# Patient Record
Sex: FEMALE | Race: BLACK OR AFRICAN AMERICAN | Employment: OTHER | URBAN - METROPOLITAN AREA
[De-identification: names, ages, dates, MRNs, and addresses within clinical notes are randomized per-mention and may not be internally consistent; named-entity substitution may affect disease eponyms.]

---

## 2021-07-26 ENCOUNTER — APPOINTMENT (OUTPATIENT)
Dept: ULTRASOUND IMAGING | Facility: HOSPITAL | Age: 76
End: 2021-07-26
Attending: EMERGENCY MEDICINE
Payer: MEDICARE

## 2021-07-26 ENCOUNTER — APPOINTMENT (OUTPATIENT)
Dept: GENERAL RADIOLOGY | Facility: HOSPITAL | Age: 76
End: 2021-07-26
Attending: EMERGENCY MEDICINE
Payer: MEDICARE

## 2021-07-26 ENCOUNTER — HOSPITAL ENCOUNTER (EMERGENCY)
Facility: HOSPITAL | Age: 76
Discharge: HOME | End: 2021-07-26
Attending: EMERGENCY MEDICINE
Payer: MEDICARE

## 2021-07-26 VITALS
TEMPERATURE: 98 F | DIASTOLIC BLOOD PRESSURE: 60 MMHG | HEART RATE: 53 BPM | SYSTOLIC BLOOD PRESSURE: 106 MMHG | RESPIRATION RATE: 16 BRPM | WEIGHT: 158 LBS | OXYGEN SATURATION: 100 %

## 2021-07-26 DIAGNOSIS — S83.91XA SPRAIN OF RIGHT KNEE, UNSPECIFIED LIGAMENT, INITIAL ENCOUNTER: Primary | ICD-10-CM

## 2021-07-26 PROCEDURE — 99284 EMERGENCY DEPT VISIT MOD MDM: CPT

## 2021-07-26 PROCEDURE — 93971 EXTREMITY STUDY: CPT | Performed by: EMERGENCY MEDICINE

## 2021-07-26 PROCEDURE — 73560 X-RAY EXAM OF KNEE 1 OR 2: CPT | Performed by: EMERGENCY MEDICINE

## 2021-07-26 RX ORDER — PREDNISONE 20 MG/1
40 TABLET ORAL DAILY
Qty: 6 TABLET | Refills: 0 | Status: SHIPPED | OUTPATIENT
Start: 2021-07-26 | End: 2021-07-29

## 2021-07-27 NOTE — ED PROVIDER NOTES
Patient Seen in: [facility] AND [facility] Emergency Department    History   Patient presents with:  Swelling Edema    Stated Complaint: swelling in legs    HPI    HPI: Asif Rojas is a 64year old female who presents with pain in r knee that started last week result:XR KNEE (1 OR 2 VIEWS), RIGHT (CPT=73560)    Result Date: 7/26/2021  CONCLUSION:  1. No acute findings. 2. Tricompartmental osteoarthritis of the right knee.     Dictated by (CST): Joe Ceballos MD on 7/26/2021 at 8:41 PM     Finalized by (

## 2021-11-30 ENCOUNTER — HOSPITAL ENCOUNTER (INPATIENT)
Facility: HOSPITAL | Age: 76
LOS: 10 days | Discharge: NURSING, REHAB OR LONG-TERM CARE | DRG: 177 | End: 2021-12-10
Attending: EMERGENCY MEDICINE | Admitting: HOSPITALIST
Payer: MEDICARE

## 2021-11-30 ENCOUNTER — APPOINTMENT (OUTPATIENT)
Dept: CT IMAGING | Facility: HOSPITAL | Age: 76
DRG: 177 | End: 2021-11-30
Attending: EMERGENCY MEDICINE
Payer: MEDICARE

## 2021-11-30 ENCOUNTER — APPOINTMENT (OUTPATIENT)
Dept: GENERAL RADIOLOGY | Facility: HOSPITAL | Age: 76
DRG: 177 | End: 2021-11-30
Payer: MEDICARE

## 2021-11-30 DIAGNOSIS — U07.1 COVID: Primary | ICD-10-CM

## 2021-11-30 DIAGNOSIS — R77.8 ELEVATED TROPONIN: ICD-10-CM

## 2021-11-30 LAB
ALBUMIN SERPL-MCNC: 2.6 G/DL (ref 3.4–5)
ALP LIVER SERPL-CCNC: 63 U/L
ALT SERPL-CCNC: 29 U/L
ANION GAP SERPL CALC-SCNC: 8 MMOL/L (ref 0–18)
AST SERPL-CCNC: 38 U/L (ref 15–37)
BILIRUB DIRECT SERPL-MCNC: 0.1 MG/DL (ref 0–0.2)
BILIRUB SERPL-MCNC: 0.4 MG/DL (ref 0.1–2)
BILIRUB UR QL: NEGATIVE
BUN BLD-MCNC: 42 MG/DL (ref 7–18)
BUN/CREAT SERPL: 26.1 (ref 10–20)
CALCIUM BLD-MCNC: 8.8 MG/DL (ref 8.5–10.1)
CHLORIDE SERPL-SCNC: 100 MMOL/L (ref 98–112)
CHOLEST SERPL-MCNC: 100 MG/DL (ref ?–200)
CLARITY UR: CLEAR
CO2 SERPL-SCNC: 26 MMOL/L (ref 21–32)
COLOR UR: YELLOW
CREAT BLD-MCNC: 1.61 MG/DL
CRP SERPL-MCNC: 7.36 MG/DL (ref ?–0.3)
D DIMER PPP FEU-MCNC: 3.73 UG/ML FEU (ref ?–0.78)
DEPRECATED HBV CORE AB SER IA-ACNC: 326 NG/ML
DIGOXIN SERPL-MCNC: 0.62 NG/ML (ref 0.8–2)
GLUCOSE BLD-MCNC: 102 MG/DL (ref 70–99)
GLUCOSE BLDC GLUCOMTR-MCNC: 100 MG/DL (ref 70–99)
GLUCOSE UR-MCNC: NEGATIVE MG/DL
HDLC SERPL-MCNC: 31 MG/DL (ref 40–59)
HGB UR QL STRIP.AUTO: NEGATIVE
KETONES UR-MCNC: NEGATIVE MG/DL
LACTATE SERPL-SCNC: 1.3 MMOL/L (ref 0.4–2)
LDLC SERPL CALC-MCNC: 43 MG/DL (ref ?–100)
LEUKOCYTE ESTERASE UR QL STRIP.AUTO: NEGATIVE
NITRITE UR QL STRIP.AUTO: NEGATIVE
NONHDLC SERPL-MCNC: 69 MG/DL (ref ?–130)
OSMOLALITY SERPL CALC.SUM OF ELEC: 289 MOSM/KG (ref 275–295)
PH UR: 5 [PH] (ref 5–8)
POTASSIUM SERPL-SCNC: 5 MMOL/L (ref 3.5–5.1)
PROCALCITONIN SERPL-MCNC: 0.61 NG/ML (ref ?–0.16)
PROT SERPL-MCNC: 8 G/DL (ref 6.4–8.2)
PROT UR-MCNC: NEGATIVE MG/DL
SARS-COV-2 RNA RESP QL NAA+PROBE: DETECTED
SODIUM SERPL-SCNC: 134 MMOL/L (ref 136–145)
SP GR UR STRIP: 1.01 (ref 1–1.03)
TRIGL SERPL-MCNC: 149 MG/DL (ref 30–149)
TROPONIN I SERPL-MCNC: 0.06 NG/ML (ref ?–0.04)
TROPONIN I SERPL-MCNC: 0.07 NG/ML (ref ?–0.04)
UROBILINOGEN UR STRIP-ACNC: <2
VLDLC SERPL CALC-MCNC: 21 MG/DL (ref 0–30)

## 2021-11-30 PROCEDURE — 99222 1ST HOSP IP/OBS MODERATE 55: CPT | Performed by: INTERNAL MEDICINE

## 2021-11-30 PROCEDURE — 99223 1ST HOSP IP/OBS HIGH 75: CPT | Performed by: HOSPITALIST

## 2021-11-30 PROCEDURE — XW033G6 INTRODUCTION OF REGN-COV2 MONOCLONAL ANTIBODY INTO PERIPHERAL VEIN, PERCUTANEOUS APPROACH, NEW TECHNOLOGY GROUP 6: ICD-10-PCS | Performed by: HOSPITALIST

## 2021-11-30 PROCEDURE — 71045 X-RAY EXAM CHEST 1 VIEW: CPT | Performed by: EMERGENCY MEDICINE

## 2021-11-30 PROCEDURE — 71260 CT THORAX DX C+: CPT | Performed by: EMERGENCY MEDICINE

## 2021-11-30 RX ORDER — METOCLOPRAMIDE HYDROCHLORIDE 5 MG/ML
5 INJECTION INTRAMUSCULAR; INTRAVENOUS EVERY 8 HOURS PRN
Status: DISCONTINUED | OUTPATIENT
Start: 2021-11-30 | End: 2021-12-10

## 2021-11-30 RX ORDER — DEXTROSE MONOHYDRATE 25 G/50ML
50 INJECTION, SOLUTION INTRAVENOUS
Status: DISCONTINUED | OUTPATIENT
Start: 2021-11-30 | End: 2021-12-10

## 2021-11-30 RX ORDER — TEMAZEPAM 15 MG/1
15 CAPSULE ORAL NIGHTLY PRN
Status: DISCONTINUED | OUTPATIENT
Start: 2021-11-30 | End: 2021-12-10

## 2021-11-30 RX ORDER — ASPIRIN 81 MG/1
324 TABLET, CHEWABLE ORAL ONCE
Status: COMPLETED | OUTPATIENT
Start: 2021-11-30 | End: 2021-11-30

## 2021-11-30 RX ORDER — ONDANSETRON 2 MG/ML
4 INJECTION INTRAMUSCULAR; INTRAVENOUS EVERY 6 HOURS PRN
Status: DISCONTINUED | OUTPATIENT
Start: 2021-11-30 | End: 2021-12-10

## 2021-11-30 RX ORDER — ACETAMINOPHEN 325 MG/1
650 TABLET ORAL EVERY 6 HOURS PRN
Status: DISCONTINUED | OUTPATIENT
Start: 2021-11-30 | End: 2021-12-10

## 2021-11-30 RX ORDER — SODIUM CHLORIDE 9 MG/ML
INJECTION, SOLUTION INTRAVENOUS CONTINUOUS
Status: DISCONTINUED | OUTPATIENT
Start: 2021-11-30 | End: 2021-12-09

## 2021-12-01 LAB
ALBUMIN SERPL-MCNC: 2.5 G/DL (ref 3.4–5)
ALBUMIN/GLOB SERPL: 0.6 {RATIO} (ref 1–2)
ALP LIVER SERPL-CCNC: 59 U/L
ALT SERPL-CCNC: 24 U/L
ANION GAP SERPL CALC-SCNC: 11 MMOL/L (ref 0–18)
AST SERPL-CCNC: 36 U/L (ref 15–37)
BASOPHILS # BLD AUTO: 0.02 X10(3) UL (ref 0–0.2)
BASOPHILS # BLD AUTO: 0.03 X10(3) UL (ref 0–0.2)
BASOPHILS NFR BLD AUTO: 0.3 %
BASOPHILS NFR BLD AUTO: 0.7 %
BILIRUB SERPL-MCNC: 0.4 MG/DL (ref 0.1–2)
BILIRUB UR QL: NEGATIVE
BUN BLD-MCNC: 41 MG/DL (ref 7–18)
BUN/CREAT SERPL: 28.9 (ref 10–20)
CALCIUM BLD-MCNC: 7.6 MG/DL (ref 8.5–10.1)
CHLORIDE SERPL-SCNC: 105 MMOL/L (ref 98–112)
CLARITY UR: CLEAR
CO2 SERPL-SCNC: 23 MMOL/L (ref 21–32)
COLOR UR: YELLOW
CREAT BLD-MCNC: 1.42 MG/DL
CREAT UR-SCNC: 71.6 MG/DL
DEPRECATED RDW RBC AUTO: 49.5 FL (ref 35.1–46.3)
DEPRECATED RDW RBC AUTO: 49.8 FL (ref 35.1–46.3)
EOSINOPHIL # BLD AUTO: 0.02 X10(3) UL (ref 0–0.7)
EOSINOPHIL # BLD AUTO: 0.05 X10(3) UL (ref 0–0.7)
EOSINOPHIL NFR BLD AUTO: 0.3 %
EOSINOPHIL NFR BLD AUTO: 1.1 %
ERYTHROCYTE [DISTWIDTH] IN BLOOD BY AUTOMATED COUNT: 16 % (ref 11–15)
ERYTHROCYTE [DISTWIDTH] IN BLOOD BY AUTOMATED COUNT: 16.1 % (ref 11–15)
EST. AVERAGE GLUCOSE BLD GHB EST-MCNC: 154 MG/DL (ref 68–126)
GLOBULIN PLAS-MCNC: 4.3 G/DL (ref 2.8–4.4)
GLUCOSE BLD-MCNC: 83 MG/DL (ref 70–99)
GLUCOSE BLDC GLUCOMTR-MCNC: 162 MG/DL (ref 70–99)
GLUCOSE BLDC GLUCOMTR-MCNC: 88 MG/DL (ref 70–99)
GLUCOSE BLDC GLUCOMTR-MCNC: 88 MG/DL (ref 70–99)
GLUCOSE BLDC GLUCOMTR-MCNC: 96 MG/DL (ref 70–99)
GLUCOSE UR-MCNC: NEGATIVE MG/DL
HBA1C MFR BLD: 7 % (ref ?–5.7)
HCT VFR BLD AUTO: 42.3 %
HCT VFR BLD AUTO: 47 %
HGB BLD-MCNC: 13.3 G/DL
HGB BLD-MCNC: 14.6 G/DL
IMM GRANULOCYTES # BLD AUTO: 0.02 X10(3) UL (ref 0–1)
IMM GRANULOCYTES # BLD AUTO: 0.03 X10(3) UL (ref 0–1)
IMM GRANULOCYTES NFR BLD: 0.4 %
IMM GRANULOCYTES NFR BLD: 0.5 %
KETONES UR-MCNC: NEGATIVE MG/DL
L PNEUMO AG UR QL: NEGATIVE
LDH SERPL L TO P-CCNC: 188 U/L
LEUKOCYTE ESTERASE UR QL STRIP.AUTO: NEGATIVE
LYMPHOCYTES # BLD AUTO: 2.18 X10(3) UL (ref 1–4)
LYMPHOCYTES # BLD AUTO: 2.27 X10(3) UL (ref 1–4)
LYMPHOCYTES NFR BLD AUTO: 39.5 %
LYMPHOCYTES NFR BLD AUTO: 47.6 %
MCH RBC QN AUTO: 26.4 PG (ref 26–34)
MCH RBC QN AUTO: 26.4 PG (ref 26–34)
MCHC RBC AUTO-ENTMCNC: 31.1 G/DL (ref 31–37)
MCHC RBC AUTO-ENTMCNC: 31.4 G/DL (ref 31–37)
MCV RBC AUTO: 84.1 FL
MCV RBC AUTO: 84.8 FL
MONOCYTES # BLD AUTO: 0.57 X10(3) UL (ref 0.1–1)
MONOCYTES # BLD AUTO: 0.83 X10(3) UL (ref 0.1–1)
MONOCYTES NFR BLD AUTO: 12.4 %
MONOCYTES NFR BLD AUTO: 14.4 %
NEUTROPHILS # BLD AUTO: 1.73 X10 (3) UL (ref 1.5–7.7)
NEUTROPHILS # BLD AUTO: 1.73 X10(3) UL (ref 1.5–7.7)
NEUTROPHILS # BLD AUTO: 2.58 X10 (3) UL (ref 1.5–7.7)
NEUTROPHILS # BLD AUTO: 2.58 X10(3) UL (ref 1.5–7.7)
NEUTROPHILS NFR BLD AUTO: 37.8 %
NEUTROPHILS NFR BLD AUTO: 45 %
NITRITE UR QL STRIP.AUTO: NEGATIVE
NT-PROBNP SERPL-MCNC: 428 PG/ML (ref ?–450)
OSMOLALITY SERPL CALC.SUM OF ELEC: 297 MOSM/KG (ref 275–295)
PH UR: 5 [PH] (ref 5–8)
PLATELET # BLD AUTO: 217 10(3)UL (ref 150–450)
PLATELET # BLD AUTO: 247 10(3)UL (ref 150–450)
POTASSIUM SERPL-SCNC: 4.2 MMOL/L (ref 3.5–5.1)
PROT SERPL-MCNC: 6.8 G/DL (ref 6.4–8.2)
PROT UR-MCNC: NEGATIVE MG/DL
RBC # BLD AUTO: 5.03 X10(6)UL
RBC # BLD AUTO: 5.54 X10(6)UL
SODIUM SERPL-SCNC: 139 MMOL/L (ref 136–145)
SODIUM SERPL-SCNC: 74 MMOL/L
SP GR UR STRIP: 1.03 (ref 1–1.03)
STREP PNEUMO ANTIGEN, URINE: NEGATIVE
UROBILINOGEN UR STRIP-ACNC: <2
WBC # BLD AUTO: 4.6 X10(3) UL (ref 4–11)
WBC # BLD AUTO: 5.8 X10(3) UL (ref 4–11)

## 2021-12-01 PROCEDURE — 99233 SBSQ HOSP IP/OBS HIGH 50: CPT | Performed by: PHYSICIAN ASSISTANT

## 2021-12-01 PROCEDURE — XW033E5 INTRODUCTION OF REMDESIVIR ANTI-INFECTIVE INTO PERIPHERAL VEIN, PERCUTANEOUS APPROACH, NEW TECHNOLOGY GROUP 5: ICD-10-PCS | Performed by: HOSPITALIST

## 2021-12-01 PROCEDURE — 99233 SBSQ HOSP IP/OBS HIGH 50: CPT | Performed by: HOSPITALIST

## 2021-12-01 RX ORDER — ISOSORBIDE MONONITRATE 30 MG/1
30 TABLET, EXTENDED RELEASE ORAL DAILY
COMMUNITY
End: 2021-12-10

## 2021-12-01 RX ORDER — SPIRONOLACTONE 25 MG/1
25 TABLET ORAL DAILY
COMMUNITY
End: 2021-12-10

## 2021-12-01 RX ORDER — METOPROLOL TARTRATE 50 MG/1
50 TABLET, FILM COATED ORAL
Status: DISCONTINUED | OUTPATIENT
Start: 2021-12-01 | End: 2021-12-01

## 2021-12-01 RX ORDER — METOPROLOL TARTRATE 50 MG/1
50 TABLET, FILM COATED ORAL 2 TIMES DAILY
COMMUNITY

## 2021-12-01 RX ORDER — PREDNISONE 1 MG/1
1 TABLET ORAL 2 TIMES DAILY WITH MEALS
Status: DISCONTINUED | OUTPATIENT
Start: 2021-12-02 | End: 2021-12-10

## 2021-12-01 RX ORDER — ALBUTEROL SULFATE 90 UG/1
2 AEROSOL, METERED RESPIRATORY (INHALATION) EVERY 6 HOURS PRN
COMMUNITY

## 2021-12-01 RX ORDER — DILTIAZEM HYDROCHLORIDE 120 MG/1
120 CAPSULE, EXTENDED RELEASE ORAL DAILY
Status: DISCONTINUED | OUTPATIENT
Start: 2021-12-01 | End: 2021-12-02

## 2021-12-01 RX ORDER — PANTOPRAZOLE SODIUM 20 MG/1
20 TABLET, DELAYED RELEASE ORAL ONCE
Status: COMPLETED | OUTPATIENT
Start: 2021-12-01 | End: 2021-12-01

## 2021-12-01 RX ORDER — DILTIAZEM HYDROCHLORIDE 120 MG/1
120 CAPSULE, EXTENDED RELEASE ORAL DAILY
COMMUNITY

## 2021-12-01 RX ORDER — FUROSEMIDE 20 MG/1
20 TABLET ORAL 2 TIMES DAILY
COMMUNITY
End: 2021-12-10

## 2021-12-01 RX ORDER — PREDNISONE 1 MG/1
1 TABLET ORAL 2 TIMES DAILY WITH MEALS
COMMUNITY

## 2021-12-01 RX ORDER — PANTOPRAZOLE SODIUM 20 MG/1
20 TABLET, DELAYED RELEASE ORAL
Status: DISCONTINUED | OUTPATIENT
Start: 2021-12-02 | End: 2021-12-03

## 2021-12-01 RX ORDER — MONTELUKAST SODIUM 10 MG/1
10 TABLET ORAL DAILY
Status: DISCONTINUED | OUTPATIENT
Start: 2021-12-01 | End: 2021-12-10

## 2021-12-01 RX ORDER — ISOSORBIDE MONONITRATE 30 MG/1
30 TABLET, EXTENDED RELEASE ORAL DAILY
Status: DISCONTINUED | OUTPATIENT
Start: 2021-12-01 | End: 2021-12-02

## 2021-12-01 RX ORDER — DIGOXIN 125 MCG
125 TABLET ORAL DAILY
Status: DISCONTINUED | OUTPATIENT
Start: 2021-12-01 | End: 2021-12-02

## 2021-12-01 RX ORDER — DOCUSATE SODIUM 100 MG/1
100 CAPSULE, LIQUID FILLED ORAL 2 TIMES DAILY
Status: DISCONTINUED | OUTPATIENT
Start: 2021-12-01 | End: 2021-12-10

## 2021-12-01 RX ORDER — OMEPRAZOLE 20 MG/1
40 CAPSULE, DELAYED RELEASE ORAL
COMMUNITY
End: 2021-12-10

## 2021-12-01 RX ORDER — SUCRALFATE 1 G/1
1 TABLET ORAL DAILY
Status: DISCONTINUED | OUTPATIENT
Start: 2021-12-01 | End: 2021-12-10

## 2021-12-01 RX ORDER — DIGOXIN 125 MCG
TABLET ORAL DAILY
COMMUNITY

## 2021-12-01 RX ORDER — SUCRALFATE 1 G/1
1 TABLET ORAL DAILY
COMMUNITY
End: 2021-12-10

## 2021-12-01 RX ORDER — SENNOSIDES 8.6 MG
8.6 TABLET ORAL DAILY
Status: DISCONTINUED | OUTPATIENT
Start: 2021-12-01 | End: 2021-12-10

## 2021-12-01 RX ORDER — MONTELUKAST SODIUM 10 MG/1
10 TABLET ORAL DAILY
COMMUNITY

## 2021-12-01 RX ORDER — ALBUTEROL SULFATE 90 UG/1
2 AEROSOL, METERED RESPIRATORY (INHALATION) EVERY 6 HOURS PRN
Status: DISCONTINUED | OUTPATIENT
Start: 2021-12-01 | End: 2021-12-10

## 2021-12-01 RX ORDER — NITROGLYCERIN 40 MG/1
1 PATCH TRANSDERMAL DAILY
COMMUNITY
End: 2021-12-10

## 2021-12-01 RX ORDER — METHIMAZOLE 5 MG/1
5 TABLET ORAL 3 TIMES DAILY
COMMUNITY
End: 2021-12-10

## 2021-12-01 RX ORDER — POLYETHYLENE GLYCOL 3350 17 G/17G
17 POWDER, FOR SOLUTION ORAL DAILY
Status: DISCONTINUED | OUTPATIENT
Start: 2021-12-01 | End: 2021-12-10

## 2021-12-01 RX ORDER — HEPARIN SODIUM 5000 [USP'U]/ML
5000 INJECTION, SOLUTION INTRAVENOUS; SUBCUTANEOUS EVERY 8 HOURS SCHEDULED
Status: DISCONTINUED | OUTPATIENT
Start: 2021-12-01 | End: 2021-12-07

## 2021-12-01 RX ORDER — LOSARTAN POTASSIUM 50 MG/1
50 TABLET ORAL 2 TIMES DAILY
COMMUNITY
End: 2021-12-10

## 2021-12-01 NOTE — DIETARY NOTE
ADULT NUTRITION INITIAL ASSESSMENT    Pt is at high nutrition risk. Pt meets severe malnutrition criteria.       CRITERIA FOR MALNUTRITION DIAGNOSIS:  Criteria for severe malnutrition diagnosis: chronic illness related to wt loss greater than 7.5% in 3 mon muscle mass temple region, clavicle region and shoulder region per visual exam.  - Fluid Accumulation: none  per RN documentation  - Skin Integrity: intact per RN documentation    ANTHROPOMETRICS:  HT: 170.2 cm (5' 7\")  WT: 58.6 kg (129 lb 1.6 oz)   BMI: transfer of nutrition care to new setting or provider: to be determined    MONITOR AND EVALUATE/NUTRITION GOALS:  - Food and Nutrient Intake:      Monitor: adequacy of PO intake, tolerance of PO intake, adequacy of supplement intake, tolerance of supplemen

## 2021-12-01 NOTE — ED QUICK NOTES
Orders for admission, patient is aware of plan and ready to go upstairs. Any questions, please call ED RN Harjit Jorge  at extension [phone].    Type of COVID test sent:Rapid  COVID Suspicion level: POSITIVE    LOC at time of transport:a/ox3    Other pertinent infor

## 2021-12-01 NOTE — ED PROVIDER NOTES
Patient Seen in: [facility] Emergency Department      History   Patient presents with:  Cough/URI  Nausea/Vomiting/Diarrhea    Stated Complaint: cough    Subjective:   HPI  Patient is a 17-year-old female history of A. fib on Eliquis?, diastolic C rhythm. Pulmonary:      Effort: Pulmonary effort is normal. No respiratory distress. Breath sounds: Normal breath sounds. Comments: Cough productive of clear sputum  Abdominal:      General: There is no distension. Tenderness:  There is no 3.73 (*)     All other components within normal limits   RAPID SARS-COV-2 BY PCR - Abnormal; Notable for the following components:    Rapid SARS-CoV-2 by PCR Detected (*)     All other components within normal limits   CBC W/ DIFFERENTIAL - Abnormal; Notab Fibrillation  Reading: A. fib, rate controlled, T wave inversion in inferior leads, no ST changes.                        MDM      Patient is a 67-year-old female history of A. fib on Eliquis?, diastolic CHF, COPD, hypertension, prediabetes, GERD, osteoarth frequent handwashing) according to CDC guidelines.                  Disposition and Plan     Clinical Impression:  COVID  (primary encounter diagnosis)  Elevated troponin     Disposition:  Admit  11/30/2021  7:35 pm    Follow-up:  No follow-up provider spec

## 2021-12-01 NOTE — PROGRESS NOTES
[facility]    Progress Note    Steph Kumar Patient Status:  Inpatient    1945 MRN [id number]   Location [facility]5W Attending Mohsen Aldrich MD   Hosp Day # 1 PCP No primary care provider on file.      Subjective:   Seen base, which spans approximately 4 cm in maximal extent. In the appropriate clinical setting, this finding is suspicious for pneumonia. Associated lung mass/neoplasm cannot be excluded radiographically and without benefit of comparison imaging.   Therefore fibrillation -irregular conduction - occasional ectopic ventricular beat -Right bundle branch block and right axis -possible right ventricular hypertrophy -consider pulmonary disease.  ABNORMAL No previous ECGs available Electronically signed on 11/30/2021 Medicine  12/1/2021

## 2021-12-01 NOTE — CM/SW NOTE
Wednesday 12/1/2021  SW received MDO for advanced directives. Per RN rounds, pt is poor historian. SW called pt's son Lorenzo Lockhart to complete initial assessment and to inquire if pt has HCPOA. No answer, unable to leave VM. Pt is COVID +, on room air.      Pe number. SW attempted to call Emelina/daughter E:[phone] this AM, no answer, VM was left. SW attempted to call Erci/son this AM p: [phone], no answer, unable to leave VM.    @11 AM  SW received call from pt's dtr Humberto Breen.  CRYSTAL discussed therapy recs fo plan. RN aware of DC plan/time. PLAN: pt to DC to [facility] [place] @ 5 PM via [facility], PCS done.   -admitting to room 15A, report is [phone]    SW remains available for support and/or discharge planning.  Please do not hes

## 2021-12-01 NOTE — CONSULTS
[facility] HOSP - [facility]    Report of Consultation    Jona Barnett Patient Status:  Emergency    1945 MRN [id number]   Location [address] Attending Dolores Slaughter MD   Hosp Day # 0 PCP No primary care provider on dizziness,   Psychiatric: denies depression, anxiety  Hematologic: denies bruising        Physical Exam:   Blood pressure 105/72, pulse 75, temperature 99.5 °F (37.5 °C), temperature source Temporal, resp.  rate 24, height 5' 7\" (1.702 m), weight 128 lb (5 COVID-19  2. Left lung opacity  3. Prior nicotine dependence    Plan   -Presents with evidence of fatigue, malaise over 4 days duration. Very poor historian. Concern for underlying COPD.   -Chest x-ray with airspace opacities seen in left lung base approxim

## 2021-12-01 NOTE — PLAN OF CARE
Problem: Patient Centered Care  Goal: Patient preferences are identified and integrated in the patient's plan of care  Description: Interventions:  - What would you like us to know as we care for you?  I live at home with my son   - Provide timely, comple arrhythmias  - Monitor electrolytes and administer replacement therapy as ordered  Outcome: Progressing     Problem: RESPIRATORY - ADULT  Goal: Achieves optimal ventilation and oxygenation  Description: INTERVENTIONS:  - Assess for changes in respiratory s ordered for tonight.

## 2021-12-01 NOTE — PLAN OF CARE
Pt from home with her son. Admitted for progressive weakness and fatigue for the last 2 days. Decreased appetite, and increased coughing. She is alert and oriented x3. Denies chest pain/SOB. Remdesevir started overnight. Placed on IVF 0.9 NS @ 100 ml/hr.  U ADULT  Goal: Achieves optimal ventilation and oxygenation  Description: INTERVENTIONS:  - Assess for changes in respiratory status  - Assess for changes in mentation and behavior  - Position to facilitate oxygenation and minimize respiratory effort  - Oxyg

## 2021-12-01 NOTE — PROGRESS NOTES
[facility]     Hospitalist Progress Note     Aniya Anton Patient Status:  Inpatient    1945 MRN [id number]   Location [facility]5W Attending Lisandra Davenport MD   Hosp Day # 1 PCP No primary care provider on file.      Chief 1.61* 1.42*   GFRAA 35* 41*   GFRNAA 30* 35*   CA 8.8 7.6*   ALB 2.6* 2.5*   * 139   K 5.0 4.2    105   CO2 26.0 23.0   ALKPHO 63 59   AST 38* 36   ALT 29 24   BILT 0.4 0.4   TP 8.0 6.8       Estimated Creatinine Clearance: 30.2 mL/min (A) (bas nodularity and ground-glass opacities suggest an atypical small airways infection/inflammation. A ground-glass nodule in the right middle lobe measures up to 18 mm, potentially infectious/inflammatory.   A pulmonary mass in the lingula measures up to 3.4 c GERD  - PPI     Hyperthyroidism   - check FT4  - cont methimazole. Greater than 35 minutes spent, >50% spent counseling re: treatment plan and workup    Plan of care discussed with patient or family at bedside.     Gabrielle Jarvis MD  [facility]

## 2021-12-01 NOTE — SLP NOTE
ADULT SWALLOWING EVALUATION    ASSESSMENT    ASSESSMENT/OVERALL IMPRESSION:  63 y/o who lives at home with her mother and son.  She has a history of chronic obstructive pulmonary disease; chronic elevated troponin; left ventricular diastolic dysfunction wit breath or sensation of pharyngeal residue. Recommend initiation of soft and bite size diet with thin liquids.   F/u with SLP dept to ensure safety with recs and to determine candidacy for diet upgrade and adherence to precautions and compensatory strategie in the lingula measures up to 3.4 cm, likely either a prominent area of rounded atelectasis or a true pulmonary mass/neoplasm.  Recommend further assessment with follow-up   pulmonary consultation and possible PET-CT. 5. Right thyroid nodule measuring 2. of Swallow: No complaints consistent with possible esophageal involvement            GOALS  Goal #1 The patient will tolerate soft and bite size diet  consistency and thin liquids without overt signs or symptoms of aspiration with 90 % accuracy over 1 sess

## 2021-12-01 NOTE — H&P
[facility]    PATIENT'S NAME: Alan Seo   ATTENDING PHYSICIAN: Eri Hunter MD   PATIENT ACCOUNT#:   [id number]    LOCATION:  [place] [address] RECORD #:   [id number]       YOB: 1945  ADMISSION DATE:       11/30/2021 reconciliation list.  Not clear if the patient takes her medications consistently at home. ALLERGIES:  No known drug allergies. FAMILY HISTORY:  Positive for hypertension and diabetes. SOCIAL HISTORY:  Ex-tobacco user.   No current tobacco, alcohol chest after gentle hydration. Obtain pulmonary consult. Start her on IV Zosyn. Follow inflammatory markers. Follow her oxygenation status. Obtain speech therapy to evaluate for aspiration. Resume her anticoagulation with Eliquis.   Hold on her blood p

## 2021-12-02 ENCOUNTER — APPOINTMENT (OUTPATIENT)
Dept: CV DIAGNOSTICS | Facility: HOSPITAL | Age: 76
DRG: 177 | End: 2021-12-02
Attending: HOSPITALIST
Payer: MEDICARE

## 2021-12-02 LAB
ALBUMIN SERPL-MCNC: 2.1 G/DL (ref 3.4–5)
ALBUMIN/GLOB SERPL: 0.5 {RATIO} (ref 1–2)
ALP LIVER SERPL-CCNC: 53 U/L
ALT SERPL-CCNC: 21 U/L
ANION GAP SERPL CALC-SCNC: 8 MMOL/L (ref 0–18)
AST SERPL-CCNC: 30 U/L (ref 15–37)
BILIRUB SERPL-MCNC: 0.3 MG/DL (ref 0.1–2)
BUN BLD-MCNC: 25 MG/DL (ref 7–18)
BUN/CREAT SERPL: 23.6 (ref 10–20)
CALCIUM BLD-MCNC: 7.8 MG/DL (ref 8.5–10.1)
CHLORIDE SERPL-SCNC: 110 MMOL/L (ref 98–112)
CO2 SERPL-SCNC: 24 MMOL/L (ref 21–32)
CREAT BLD-MCNC: 1.06 MG/DL
DEPRECATED RDW RBC AUTO: 49.9 FL (ref 35.1–46.3)
ERYTHROCYTE [DISTWIDTH] IN BLOOD BY AUTOMATED COUNT: 16 % (ref 11–15)
GLOBULIN PLAS-MCNC: 4 G/DL (ref 2.8–4.4)
GLUCOSE BLD-MCNC: 81 MG/DL (ref 70–99)
GLUCOSE BLDC GLUCOMTR-MCNC: 102 MG/DL (ref 70–99)
GLUCOSE BLDC GLUCOMTR-MCNC: 144 MG/DL (ref 70–99)
GLUCOSE BLDC GLUCOMTR-MCNC: 91 MG/DL (ref 70–99)
GLUCOSE BLDC GLUCOMTR-MCNC: 94 MG/DL (ref 70–99)
HCT VFR BLD AUTO: 36.8 %
HGB BLD-MCNC: 11.8 G/DL
MCH RBC QN AUTO: 26.8 PG (ref 26–34)
MCHC RBC AUTO-ENTMCNC: 32.1 G/DL (ref 31–37)
MCV RBC AUTO: 83.6 FL
OSMOLALITY SERPL CALC.SUM OF ELEC: 297 MOSM/KG (ref 275–295)
PLATELET # BLD AUTO: 191 10(3)UL (ref 150–450)
POTASSIUM SERPL-SCNC: 3.8 MMOL/L (ref 3.5–5.1)
PROCALCITONIN SERPL-MCNC: 0.22 NG/ML (ref ?–0.16)
PROT SERPL-MCNC: 6.1 G/DL (ref 6.4–8.2)
RBC # BLD AUTO: 4.4 X10(6)UL
SODIUM SERPL-SCNC: 142 MMOL/L (ref 136–145)
T4 FREE SERPL-MCNC: 2.4 NG/DL (ref 0.8–1.7)
TSI SER-ACNC: <0.005 MIU/ML (ref 0.36–3.74)
WBC # BLD AUTO: 4.3 X10(3) UL (ref 4–11)

## 2021-12-02 PROCEDURE — 99233 SBSQ HOSP IP/OBS HIGH 50: CPT | Performed by: HOSPITALIST

## 2021-12-02 PROCEDURE — 99232 SBSQ HOSP IP/OBS MODERATE 35: CPT | Performed by: PHYSICIAN ASSISTANT

## 2021-12-02 PROCEDURE — 93306 TTE W/DOPPLER COMPLETE: CPT | Performed by: HOSPITALIST

## 2021-12-02 RX ORDER — BENZONATATE 100 MG/1
100 CAPSULE ORAL 3 TIMES DAILY PRN
Status: DISCONTINUED | OUTPATIENT
Start: 2021-12-02 | End: 2021-12-10

## 2021-12-02 NOTE — PLAN OF CARE
Confused to time. Up with one and walker. From home with 78year old mother and son. 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 On room air. Plan for echo today.    Problem: Patient Centered Care  Goal: Patient preferences are identified and integrated in the patien indicated  - Evaluate effectiveness of antiarrhythmic and heart rate control medications as ordered  - Initiate emergency measures for life threatening arrhythmias  - Monitor electrolytes and administer replacement therapy as ordered  Outcome: Progressing

## 2021-12-02 NOTE — PROGRESS NOTES
[facility]    Progress Note    Aniya Anton Patient Status:  Inpatient    1945 MRN [id number]   Location [facility]5W Attending Lisandra Davenport MD   [facility] Day # 2 PCP No primary care provider on file.      Subjective:   Seen XR CHEST AP PORTABLE  (CPT=71045)    Result Date: 11/30/2021  CONCLUSION:  1. Ill-defined airspace disease at the periphery of the left lung base, which spans approximately 4 cm in maximal extent.   In the appropriate clinical setting, this finding is Carmen Jacobson MD on 11/30/2021 at 10:11 PM          EKG 12 Lead    Result Date: 11/30/2021  ECG Report  Interpretation  -------------------------- Atrial fibrillation -irregular conduction - occasional ectopic ventricular beat -Right bundle branch block and right

## 2021-12-02 NOTE — PLAN OF CARE
Pt alert and oriented x3. Denies chest pain. Occasional SOB with exertion. RA saturating well. Still getting Remdesivir and Zosyn. Reported heartburn during the night. MD notified. IVF 0.9 NS @ 100 ml/hr. Home meds restarted overnight.      Problem: Patient behavior  - Position to facilitate oxygenation and minimize respiratory effort  - Oxygen supplementation based on oxygen saturation or ABGs  - Provide Smoking Cessation handout, if applicable  - Encourage broncho-pulmonary hygiene including cough, deep garret

## 2021-12-02 NOTE — PROGRESS NOTES
[place] [facility] - [facility]     Hospitalist Progress Note     Mireya Lassiter Patient Status:  Inpatient    1945 MRN [id number]   Location [facility]5W Attending Amrik Mena MD   [facility] Day # 2 PCP No primary care provider on file.      Chief CREATSERUM 1.61* 1.42* 1.06*   GFRAA 35* 41* 58*   GFRNAA 30* 35* 50*   CA 8.8 7.6* 7.8*   ALB 2.6* 2.5* 2.1*   * 139 142   K 5.0 4.2 3.8    105 110   CO2 26.0 23.0 24.0   ALKPHO 63 59 53*   AST 38* 36 30   ALT 29 24 21   BILT 0.4 0.4 0.3   T Diffuse coronary artery calcifications and mitral annular calcifications. 3. Moderate to severe atherosclerotic calcification of the visualized aorta. There is mild aneurysmal dilation of the ascending aorta up to 4 cm.   4. Radiographically moderate par following   -daily labs including inflammatory markers    Lingular mass   - CT chest with 3.4cm pulm mass in lingula pulm mass vs neoplasm vs infection.  outpt PET CT recommended  - PCT high   - treat with zosyn for possible asp PNA  - would recommend rpt i

## 2021-12-03 ENCOUNTER — APPOINTMENT (OUTPATIENT)
Dept: CT IMAGING | Facility: HOSPITAL | Age: 76
DRG: 177 | End: 2021-12-03
Attending: HOSPITALIST
Payer: MEDICARE

## 2021-12-03 ENCOUNTER — APPOINTMENT (OUTPATIENT)
Dept: GENERAL RADIOLOGY | Facility: HOSPITAL | Age: 76
DRG: 177 | End: 2021-12-03
Attending: HOSPITALIST
Payer: MEDICARE

## 2021-12-03 LAB
ALBUMIN SERPL-MCNC: 2.2 G/DL (ref 3.4–5)
ALBUMIN/GLOB SERPL: 0.6 {RATIO} (ref 1–2)
ALP LIVER SERPL-CCNC: 56 U/L
ALT SERPL-CCNC: 19 U/L
ANION GAP SERPL CALC-SCNC: 7 MMOL/L (ref 0–18)
AST SERPL-CCNC: 32 U/L (ref 15–37)
BILIRUB SERPL-MCNC: 0.2 MG/DL (ref 0.1–2)
BUN BLD-MCNC: 17 MG/DL (ref 7–18)
BUN/CREAT SERPL: 19.3 (ref 10–20)
CALCIUM BLD-MCNC: 8.1 MG/DL (ref 8.5–10.1)
CHLORIDE SERPL-SCNC: 113 MMOL/L (ref 98–112)
CO2 SERPL-SCNC: 23 MMOL/L (ref 21–32)
CREAT BLD-MCNC: 0.88 MG/DL
CRP SERPL-MCNC: 7.56 MG/DL (ref ?–0.3)
D DIMER PPP FEU-MCNC: 3.09 UG/ML FEU (ref ?–0.78)
DEPRECATED HBV CORE AB SER IA-ACNC: 462.5 NG/ML
GLOBULIN PLAS-MCNC: 4 G/DL (ref 2.8–4.4)
GLUCOSE BLD-MCNC: 81 MG/DL (ref 70–99)
GLUCOSE BLDC GLUCOMTR-MCNC: 109 MG/DL (ref 70–99)
GLUCOSE BLDC GLUCOMTR-MCNC: 80 MG/DL (ref 70–99)
GLUCOSE BLDC GLUCOMTR-MCNC: 88 MG/DL (ref 70–99)
GLUCOSE BLDC GLUCOMTR-MCNC: 88 MG/DL (ref 70–99)
GLUCOSE BLDC GLUCOMTR-MCNC: 90 MG/DL (ref 70–99)
LACTATE SERPL-SCNC: 1.4 MMOL/L (ref 0.4–2)
LDH SERPL L TO P-CCNC: 230 U/L
OSMOLALITY SERPL CALC.SUM OF ELEC: 297 MOSM/KG (ref 275–295)
POTASSIUM SERPL-SCNC: 4.4 MMOL/L (ref 3.5–5.1)
PROT SERPL-MCNC: 6.2 G/DL (ref 6.4–8.2)
SODIUM SERPL-SCNC: 143 MMOL/L (ref 136–145)

## 2021-12-03 PROCEDURE — 99232 SBSQ HOSP IP/OBS MODERATE 35: CPT | Performed by: INTERNAL MEDICINE

## 2021-12-03 PROCEDURE — 71045 X-RAY EXAM CHEST 1 VIEW: CPT | Performed by: HOSPITALIST

## 2021-12-03 PROCEDURE — 70450 CT HEAD/BRAIN W/O DYE: CPT | Performed by: HOSPITALIST

## 2021-12-03 PROCEDURE — 99233 SBSQ HOSP IP/OBS HIGH 50: CPT | Performed by: HOSPITALIST

## 2021-12-03 RX ORDER — CALCIUM CARBONATE 200(500)MG
500 TABLET,CHEWABLE ORAL 3 TIMES DAILY PRN
Status: DISCONTINUED | OUTPATIENT
Start: 2021-12-03 | End: 2021-12-10

## 2021-12-03 RX ORDER — MAGNESIUM HYDROXIDE/ALUMINUM HYDROXICE/SIMETHICONE 120; 1200; 1200 MG/30ML; MG/30ML; MG/30ML
30 SUSPENSION ORAL EVERY 6 HOURS PRN
Status: DISCONTINUED | OUTPATIENT
Start: 2021-12-03 | End: 2021-12-10

## 2021-12-03 RX ORDER — METHIMAZOLE 5 MG/1
5 TABLET ORAL 3 TIMES DAILY
Status: DISCONTINUED | OUTPATIENT
Start: 2021-12-03 | End: 2021-12-04

## 2021-12-03 NOTE — SPIRITUAL CARE NOTE
responded to bedside RN call. RN stated that Pt is requesting a .  requested that telephone will in reach of Pt. Pt is a 64year old female, admitted 11/30/2021.  Pt charts shows:   COVID-19 Positive; Cough, sore throat, sob, n

## 2021-12-03 NOTE — PLAN OF CARE
Pt is A&Ox3. On 2L via nasal cannula for comfort. Complaint of headache. PRNs given. Receiving iv abx and remdesivir. F/u chest x-ray done.  Plan is to finish meds and hh   Problem: Patient Centered Care  Goal: Patient preferences are identified and integr 12 lead EKG if indicated  - Evaluate effectiveness of antiarrhythmic and heart rate control medications as ordered  - Initiate emergency measures for life threatening arrhythmias  - Monitor electrolytes and administer replacement therapy as ordered  Outcom

## 2021-12-03 NOTE — OCCUPATIONAL THERAPY NOTE
OCCUPATIONAL THERAPY EVALUATION - INPATIENT     Room Number: 503/503-A  Evaluation Date: 12/3/2021  Type of Evaluation: Initial  Presenting Problem: cough, SOB ; COVID    Physician Order: IP Consult to Occupational Therapy  Reason for Therapy: ADL/IADL Dys DISCHARGE RECOMMENDATIONS  OT Discharge Recommendations: Sub-acute rehabilitation       PLAN  OT Treatment Plan: Balance activities; Energy conservation/work simplification techniques;ADL training;Functional transfer training; Endurance training;Equipment upper body clothing?: A Lot  -   Taking care of personal grooming such as brushing teeth?: A Little  -   Eating meals?: A Little    AM-PAC Score:  Score: 14  Approx Degree of Impairment: 59.67%  Standardized Score (AM-PAC Scale): 33.39  CMS Modifier (G-Cod

## 2021-12-03 NOTE — SPIRITUAL CARE NOTE
initiated follow up tele-conference visit.  made second attempt. Pt did not respond.  consulted with bedside RN. RN reported that Pt has been resting all day. Pt was asleep on first attempt.   RN stated that she will inform P

## 2021-12-03 NOTE — DIETARY NOTE
Brief Nutrition Update Note    Update 12/03/21:  Pt seen for follow up. Pt with fair intake, eating ~57% x4 meals documented since last visit. Pt reports not feeling well and not wanting to eat due to this. Noted full cup of Glucerna in room, untouched.  Pt

## 2021-12-03 NOTE — PLAN OF CARE
A&Ox3, confused to time. Complaining about more weakness today. States she feel worse today. On room air, tempeture low, will monitor. Pt given warm blanket and temp in room increased. Iv fluids infusing. Pt updated on plan of care.  Fall precautions in wilma baseline  Description: INTERVENTIONS:  - Continuous cardiac monitoring, monitor vital signs, obtain 12 lead EKG if indicated  - Evaluate effectiveness of antiarrhythmic and heart rate control medications as ordered  - Initiate emergency measures for life t ordered  - Instruct patient on fluid and nutrition restrictions as appropriate  Outcome: Progressing

## 2021-12-03 NOTE — PROGRESS NOTES
[facility] - [facility]     Progress Note        Ita Ny Patient Status:  Inpatient    1945 MRN [id number]   Location [facility]5W Attending Charo Van MD   [facility] Day # 3 PCP No primary care provider on file.        Subjective: tab 8 tablet, 8 tablet, Oral, Q15 Min PRN  Insulin Aspart Pen (NOVOLOG) 100 UNIT/ML flexpen 1-7 Units, 1-7 Units, Subcutaneous, TID CC  0.9% NaCl infusion, , Intravenous, Continuous  acetaminophen (TYLENOL) tab 650 mg, 650 mg, Oral, Q6H PRN  ondansetron (Z infectious process or atelectatic changes. Cannot rule out malignancy. Recommend course of antibiotic therapy and follow-up PET/CT as outpatient.  -Anticipate possible discharge later today.   Okay to transition to oral antibiotic therapy with Augmentin u

## 2021-12-03 NOTE — PROGRESS NOTES
[place] [facility] - [facility]     Hospitalist Progress Note     Allyson Nguyễn Patient Status:  Inpatient    1945 MRN [id number]   Location [facility]5W Attending Nisreen Candelaria MD   [facility] Day # 3 PCP No primary care provider on file.      Chief ALKPHO 59 53* 56   AST 36 30 32   ALT 24 21 19   BILT 0.4 0.3 0.2   TP 6.8 6.1* 6.2*       Estimated Creatinine Clearance: 50 mL/min (based on SCr of 0.88 mg/dL). No results for input(s): PTP, INR in the last 168 hours.          Culture:  [facility] possible asp PNA  - would recommend rpt imaging outpt and if persistent can pursue further workup d/w pulm. - SLP for bedside swallow may need to consider video swallow   - keep HOB elevated. Acute metabolic encephalopathy  - ?  Infection related vs o

## 2021-12-03 NOTE — PHYSICAL THERAPY NOTE
PHYSICAL THERAPY EVALUATION - INPATIENT     Room Number: 503/503-A  Evaluation Date: 12/3/2021  Type of Evaluation: Initial   Physician Order: PT Eval and Treat    Presenting Problem: COVID  Reason for Therapy: Mobility Dysfunction and Discharge Planning care. However, patient would benefit from rehab facility. RN aware of patient status post session. Patient will benefit from continued IP PT services to address these deficits in preparation for discharge.     DISCHARGE RECOMMENDATIONS  PT Discharge Shaun from a chair with arms (e.g., wheelchair, bedside commode, etc.): Unable   Patient Difficulty: Moving from lying on back to sitting on the side of the bed?: A Little   How much help from another person does the patient currently need. ..    Help from Another

## 2021-12-04 ENCOUNTER — APPOINTMENT (OUTPATIENT)
Dept: GENERAL RADIOLOGY | Facility: HOSPITAL | Age: 76
DRG: 177 | End: 2021-12-04
Attending: HOSPITALIST
Payer: MEDICARE

## 2021-12-04 LAB
ALBUMIN SERPL-MCNC: 2.5 G/DL (ref 3.4–5)
ALBUMIN/GLOB SERPL: 0.5 {RATIO} (ref 1–2)
ALP LIVER SERPL-CCNC: 66 U/L
ALT SERPL-CCNC: 21 U/L
ANION GAP SERPL CALC-SCNC: 8 MMOL/L (ref 0–18)
AST SERPL-CCNC: 38 U/L (ref 15–37)
BILIRUB SERPL-MCNC: 0.5 MG/DL (ref 0.1–2)
BUN BLD-MCNC: 16 MG/DL (ref 7–18)
BUN/CREAT SERPL: 19 (ref 10–20)
CALCIUM BLD-MCNC: 8.2 MG/DL (ref 8.5–10.1)
CHLORIDE SERPL-SCNC: 112 MMOL/L (ref 98–112)
CO2 SERPL-SCNC: 22 MMOL/L (ref 21–32)
CREAT BLD-MCNC: 0.84 MG/DL
CRP SERPL-MCNC: 8.42 MG/DL (ref ?–0.3)
D DIMER PPP FEU-MCNC: 2.06 UG/ML FEU (ref ?–0.78)
DEPRECATED HBV CORE AB SER IA-ACNC: 475.2 NG/ML
DEPRECATED RDW RBC AUTO: 50.6 FL (ref 35.1–46.3)
ERYTHROCYTE [DISTWIDTH] IN BLOOD BY AUTOMATED COUNT: 16.5 % (ref 11–15)
GLOBULIN PLAS-MCNC: 4.6 G/DL (ref 2.8–4.4)
GLUCOSE BLD-MCNC: 102 MG/DL (ref 70–99)
GLUCOSE BLDC GLUCOMTR-MCNC: 102 MG/DL (ref 70–99)
GLUCOSE BLDC GLUCOMTR-MCNC: 102 MG/DL (ref 70–99)
GLUCOSE BLDC GLUCOMTR-MCNC: 91 MG/DL (ref 70–99)
GLUCOSE BLDC GLUCOMTR-MCNC: 97 MG/DL (ref 70–99)
HCT VFR BLD AUTO: 43.5 %
HGB BLD-MCNC: 13.7 G/DL
LDH SERPL L TO P-CCNC: 308 U/L
MCH RBC QN AUTO: 26.4 PG (ref 26–34)
MCHC RBC AUTO-ENTMCNC: 31.5 G/DL (ref 31–37)
MCV RBC AUTO: 83.8 FL
OSMOLALITY SERPL CALC.SUM OF ELEC: 295 MOSM/KG (ref 275–295)
PLATELET # BLD AUTO: 222 10(3)UL (ref 150–450)
POTASSIUM SERPL-SCNC: 4.4 MMOL/L (ref 3.5–5.1)
PROT SERPL-MCNC: 7.1 G/DL (ref 6.4–8.2)
RBC # BLD AUTO: 5.19 X10(6)UL
SODIUM SERPL-SCNC: 142 MMOL/L (ref 136–145)
WBC # BLD AUTO: 6.5 X10(3) UL (ref 4–11)

## 2021-12-04 PROCEDURE — 71045 X-RAY EXAM CHEST 1 VIEW: CPT | Performed by: HOSPITALIST

## 2021-12-04 PROCEDURE — 99233 SBSQ HOSP IP/OBS HIGH 50: CPT | Performed by: HOSPITALIST

## 2021-12-04 PROCEDURE — 99232 SBSQ HOSP IP/OBS MODERATE 35: CPT | Performed by: INTERNAL MEDICINE

## 2021-12-04 RX ORDER — METOPROLOL TARTRATE 50 MG/1
50 TABLET, FILM COATED ORAL
Status: DISCONTINUED | OUTPATIENT
Start: 2021-12-04 | End: 2021-12-10

## 2021-12-04 RX ORDER — METHIMAZOLE 10 MG/1
10 TABLET ORAL 3 TIMES DAILY
Status: DISCONTINUED | OUTPATIENT
Start: 2021-12-04 | End: 2021-12-10

## 2021-12-04 NOTE — PLAN OF CARE
Problem: Patient Centered Care  Goal: Patient preferences are identified and integrated in the patient's plan of care  Description: Interventions:  - What would you like us to know as we care for you?  I live at home with my son   - Provide timely, comple if indicated  - Evaluate effectiveness of antiarrhythmic and heart rate control medications as ordered  - Initiate emergency measures for life threatening arrhythmias  - Monitor electrolytes and administer replacement therapy as ordered  12/4/2021 1637 by ordered  - Instruct patient on fluid and nutrition restrictions as appropriate  12/4/2021 1637 by Mary Schmidt, RN  Outcome: Progressing     Patient is alert and oriented, poor appetite, RA, SOB with exertion.  Video swallow in AM. Pt stated some abdomi

## 2021-12-04 NOTE — PROGRESS NOTES
Pulmonary/Critical Care Follow Up Note    HPI:   Venita Argueta is a 64year old female with Patient presents with:  Cough/URI  Nausea/Vomiting/Diarrhea      PCP No primary care provider on file.   Admission Attending Rodney Iniguez MD    Hospital Day #4 glucose-vitamin C (DEX-4) chewable tab 8 tablet, 8 tablet, Oral, Q15 Min PRN  •  Insulin Aspart Pen (NOVOLOG) 100 UNIT/ML flexpen 1-7 Units, 1-7 Units, Subcutaneous, TID CC  •  0.9% NaCl infusion, , Intravenous, Continuous  •  acetaminophen (TYLENOL) tab 6

## 2021-12-04 NOTE — PLAN OF CARE
Pt is A&Ox3. On RA. 2L via nasal cannula at times for comfort. Pt complaining of stomach and chest pain. MD notified. PRN Maalox , metoprolol, and increase thyroid medication ordered per md. IV abx and Remdesivir continued. Chest x-ray done.  Plan is to mo baseline  Description: INTERVENTIONS:  - Continuous cardiac monitoring, monitor vital signs, obtain 12 lead EKG if indicated  - Evaluate effectiveness of antiarrhythmic and heart rate control medications as ordered  - Initiate emergency measures for life t ordered  - Instruct patient on fluid and nutrition restrictions as appropriate  Outcome: Progressing

## 2021-12-04 NOTE — CM/SW NOTE
Per call received by Dr Blanco Romano, VIANCA referral MDO. DON screen requested. Aidin referral uploaded in aidin. PLAN;  VIANCA    CM/SW to remain available for support and/or discharge planning.     Mónica Worrell RN, CCM    Ext. [phone]

## 2021-12-04 NOTE — PROGRESS NOTES
[facility]     Hospitalist Progress Note     Renettalilly Silva Patient Status:  Inpatient    1945 MRN [id number]   Location [facility]5W Attending Sofia Pittman MD   Hosp Day # 4 PCP No primary care provider on file.      Chief 2. 1* 2.2* 2.5*    143 142   K 3.8 4.4 4.4    113* 112   CO2 24.0 23.0 22.0   ALKPHO 53* 56 66   AST 30 32 38*   ALT 21 19 21   BILT 0.3 0.2 0.5   TP 6.1* 6.2* 7.1       Estimated Creatinine Clearance: 51.8 mL/min (based on SCr of 0.84 mg/dL). changes in the bilateral upper lung fields suggested. 2. Persistent 5.2 x 2.3 cm opacity at the left lung base could be an area of pneumonia but underlying mass not excluded. Follow-up study and or PET scanning advised.     Dictated by (CST): Ema Boas resolves. - no signs of acute flare.   - cont Albuterol inhaler as needed. H/o HFpEF  Abnormal troponin   Moderate pulm HTN  Chronic a.fib   - Euvolemic   - rpt ECHO reviewed EF normal. LA dilated, moder MVR.  Mild pulm HTN   - check   - hold h

## 2021-12-05 LAB
ALBUMIN SERPL-MCNC: 2 G/DL (ref 3.4–5)
ALBUMIN/GLOB SERPL: 0.5 {RATIO} (ref 1–2)
ALP LIVER SERPL-CCNC: 60 U/L
ALT SERPL-CCNC: 17 U/L
ANION GAP SERPL CALC-SCNC: 6 MMOL/L (ref 0–18)
AST SERPL-CCNC: 29 U/L (ref 15–37)
BILIRUB SERPL-MCNC: 0.5 MG/DL (ref 0.1–2)
BUN BLD-MCNC: 15 MG/DL (ref 7–18)
BUN/CREAT SERPL: 18.8 (ref 10–20)
CALCIUM BLD-MCNC: 8.2 MG/DL (ref 8.5–10.1)
CHLORIDE SERPL-SCNC: 113 MMOL/L (ref 98–112)
CO2 SERPL-SCNC: 23 MMOL/L (ref 21–32)
CREAT BLD-MCNC: 0.8 MG/DL
DEPRECATED RDW RBC AUTO: 49.2 FL (ref 35.1–46.3)
ERYTHROCYTE [DISTWIDTH] IN BLOOD BY AUTOMATED COUNT: 16.3 % (ref 11–15)
GLOBULIN PLAS-MCNC: 4.1 G/DL (ref 2.8–4.4)
GLUCOSE BLD-MCNC: 84 MG/DL (ref 70–99)
GLUCOSE BLDC GLUCOMTR-MCNC: 107 MG/DL (ref 70–99)
GLUCOSE BLDC GLUCOMTR-MCNC: 112 MG/DL (ref 70–99)
GLUCOSE BLDC GLUCOMTR-MCNC: 116 MG/DL (ref 70–99)
GLUCOSE BLDC GLUCOMTR-MCNC: 75 MG/DL (ref 70–99)
HCT VFR BLD AUTO: 37.6 %
HGB BLD-MCNC: 12.1 G/DL
MCH RBC QN AUTO: 26.6 PG (ref 26–34)
MCHC RBC AUTO-ENTMCNC: 32.2 G/DL (ref 31–37)
MCV RBC AUTO: 82.6 FL
OSMOLALITY SERPL CALC.SUM OF ELEC: 294 MOSM/KG (ref 275–295)
PLATELET # BLD AUTO: 233 10(3)UL (ref 150–450)
POTASSIUM SERPL-SCNC: 4.2 MMOL/L (ref 3.5–5.1)
PROT SERPL-MCNC: 6.1 G/DL (ref 6.4–8.2)
RBC # BLD AUTO: 4.55 X10(6)UL
SODIUM SERPL-SCNC: 142 MMOL/L (ref 136–145)
WBC # BLD AUTO: 7 X10(3) UL (ref 4–11)

## 2021-12-05 PROCEDURE — 99232 SBSQ HOSP IP/OBS MODERATE 35: CPT | Performed by: INTERNAL MEDICINE

## 2021-12-05 PROCEDURE — 99233 SBSQ HOSP IP/OBS HIGH 50: CPT | Performed by: HOSPITALIST

## 2021-12-05 NOTE — PLAN OF CARE
Problem: Patient Centered Care  Goal: Patient preferences are identified and integrated in the patient's plan of care  Description: Interventions:  - What would you like us to know as we care for you?  I live at home with my son   - Provide timely, comple arrhythmias  - Monitor electrolytes and administer replacement therapy as ordered  Outcome: Progressing     Problem: RESPIRATORY - ADULT  Goal: Achieves optimal ventilation and oxygenation  Description: INTERVENTIONS:  - Assess for changes in respiratory s

## 2021-12-05 NOTE — PROGRESS NOTES
[facility]    Progress Note    Aniya Anton Patient Status:  Inpatient    1945 MRN [id number]   Location [facility]5W Attending Lisandra Davenport MD   Hosp Day # 5 PCP No primary care provider on file.      Subjective:   Subj CHEST AP PORTABLE  (CPT=71045)    Result Date: 12/4/2021  CONCLUSION:  1. Interval worsening in moderately extensive bilateral airspace disease suggesting worsening bilateral pneumonia. Correlate clinically and follow-up studies are advised.   Minimal blun

## 2021-12-05 NOTE — SLP NOTE
SPEECH DAILY NOTE - INPATIENT    ASSESSMENT & PLAN   ASSESSMENT    PPE REQUIRED. THE PT IS COVID 19+. THIS THERAPIST WORE GOWN, GOGGLES, GLOVES AND N 95 MASK FOR DURATION OF TX SESSION. HANDS WASHED UPON ENTRANCE/EXIT.      Received VFSS orders per MD.  MD throat at a level 7 on the pain scale when swallowing. Oral/buccal cavities clear of residue at the end of the session. Unable to R/O silent aspiration without a VFSS. VFSS to be completed when pt is safe to travel for testing.      CXR 12/4/21:  Keshawn Prakash strategies independently over 2 session(s). Education provided to the pt on swallowing precautions. Discussed no straw precaution and removed the straw from the pt's water cup. Posted swallowing precautions at bedside.   No family was present for educa

## 2021-12-05 NOTE — PLAN OF CARE
IV ABX, RDSV continued. Reports intermittent abdominal discomfort, declined intervention.     Problem: Patient Centered Care  Goal: Patient preferences are identified and integrated in the patient's plan of care  Description: Interventions:  - What would yo rate control medications as ordered  - Initiate emergency measures for life threatening arrhythmias  - Monitor electrolytes and administer replacement therapy as ordered  Outcome: Progressing     Problem: RESPIRATORY - ADULT  Goal: Achieves optimal ventila

## 2021-12-05 NOTE — PROGRESS NOTES
[facility]     Hospitalist Progress Note     Trice Garcia Patient Status:  Inpatient    1945 MRN [id number]   Location [facility]5W Attending Katie Birch MD   Hosp Day # 5 PCP No primary care provider on file.      Chief 67 71   CA 8.1* 8.2* 8.2*   ALB 2.2* 2.5* 2.0*    142 142   K 4.4 4.4 4.2   * 112 113*   CO2 23.0 22.0 23.0   ALKPHO 56 66 60   AST 32 38* 29   ALT 19 21 17   BILT 0.2 0.5 0.5   TP 6.2* 7.1 6.1*       Estimated Creatinine Clearance: 54.4 mL/min sennosides  8.6 mg Oral Daily   • docusate sodium  100 mg Oral BID   • Heparin Sodium (Porcine)  5,000 Units Subcutaneous Q8H [address]   • montelukast  10 mg Oral Daily   • predniSONE  1 mg Oral BID with meals   • sucralfate  1 g Oral Daily   • piperacillin-pritesh MD  Hospitalist      Supplementary Documentation:     Quality:  · Diet: general modified.    · PT/OT: SNF  · DVT Prophylaxis: start heparin  · CODE status: Full   · Dispo: per clinical course      Estimated date of discharge: Monday   Discharge is dependent

## 2021-12-06 ENCOUNTER — APPOINTMENT (OUTPATIENT)
Dept: PICC SERVICES | Facility: HOSPITAL | Age: 76
DRG: 177 | End: 2021-12-06
Attending: HOSPITALIST
Payer: MEDICARE

## 2021-12-06 ENCOUNTER — APPOINTMENT (OUTPATIENT)
Dept: GENERAL RADIOLOGY | Facility: HOSPITAL | Age: 76
DRG: 177 | End: 2021-12-06
Attending: HOSPITALIST
Payer: MEDICARE

## 2021-12-06 LAB
ANION GAP SERPL CALC-SCNC: 7 MMOL/L (ref 0–18)
BUN BLD-MCNC: 18 MG/DL (ref 7–18)
BUN/CREAT SERPL: 22.2 (ref 10–20)
CALCIUM BLD-MCNC: 8.6 MG/DL (ref 8.5–10.1)
CHLORIDE SERPL-SCNC: 115 MMOL/L (ref 98–112)
CO2 SERPL-SCNC: 21 MMOL/L (ref 21–32)
CREAT BLD-MCNC: 0.81 MG/DL
DEPRECATED RDW RBC AUTO: 49 FL (ref 35.1–46.3)
ERYTHROCYTE [DISTWIDTH] IN BLOOD BY AUTOMATED COUNT: 16.3 % (ref 11–15)
GLUCOSE BLD-MCNC: 89 MG/DL (ref 70–99)
GLUCOSE BLDC GLUCOMTR-MCNC: 122 MG/DL (ref 70–99)
GLUCOSE BLDC GLUCOMTR-MCNC: 141 MG/DL (ref 70–99)
GLUCOSE BLDC GLUCOMTR-MCNC: 94 MG/DL (ref 70–99)
GLUCOSE BLDC GLUCOMTR-MCNC: 96 MG/DL (ref 70–99)
HCT VFR BLD AUTO: 38 %
HGB BLD-MCNC: 12.2 G/DL
MCH RBC QN AUTO: 26.5 PG (ref 26–34)
MCHC RBC AUTO-ENTMCNC: 32.1 G/DL (ref 31–37)
MCV RBC AUTO: 82.4 FL
OSMOLALITY SERPL CALC.SUM OF ELEC: 297 MOSM/KG (ref 275–295)
PLATELET # BLD AUTO: 251 10(3)UL (ref 150–450)
POTASSIUM SERPL-SCNC: 4.1 MMOL/L (ref 3.5–5.1)
RBC # BLD AUTO: 4.61 X10(6)UL
SODIUM SERPL-SCNC: 143 MMOL/L (ref 136–145)
WBC # BLD AUTO: 6.2 X10(3) UL (ref 4–11)

## 2021-12-06 PROCEDURE — 74230 X-RAY XM SWLNG FUNCJ C+: CPT | Performed by: HOSPITALIST

## 2021-12-06 PROCEDURE — 99232 SBSQ HOSP IP/OBS MODERATE 35: CPT | Performed by: INTERNAL MEDICINE

## 2021-12-06 PROCEDURE — 99233 SBSQ HOSP IP/OBS HIGH 50: CPT | Performed by: HOSPITALIST

## 2021-12-06 NOTE — DIETARY NOTE
ADULT NUTRITION REASSESSMENT    Pt is at high nutrition risk. Pt meets severe malnutrition criteria.       CRITERIA FOR MALNUTRITION DIAGNOSIS:  Criteria for severe malnutrition diagnosis: chronic illness related to wt loss greater than 7.5% in 3 months an support (TF) is indicated.  Discussed recommendation with MD.     FOOD/NUTRITION RELATED HISTORY:  Appetite: Poor  Intake: ~3% x4 meals documented since last visit  Intake Meeting Needs: No, even with oral nutrition supplements (ONS) ordered  Percent Meals documentation    ANTHROPOMETRICS:  HT: 170.2 cm (5' 7\")  WT: 59.5 kg (131 lb 3.2 oz)   BMI: Body mass index is 20.55 kg/m².   BMI CLASSIFICATION: <22 considered underweight for elderly age  IBW: 135 lbs        95% IBW  Usual Body Wt: 140 lbs per son      5 EVALUATE/NUTRITION GOALS:  - Food and Nutrient Intake:      Monitor: adequacy of PO intake, tolerance of PO intake, adequacy of supplement intake, tolerance of supplement intake and for PO diet advancement.   - Food and Nutrient Administration:      Monitor

## 2021-12-06 NOTE — PROGRESS NOTES
Pulmonary/Critical Care Follow Up Note    HPI:   Natasha Morris is a 64year old female with Patient presents with:  Cough/URI  Nausea/Vomiting/Diarrhea      PCP No primary care provider on file.   Admission Attending Vita Monique MD    Hospital Day #6 C (DEX-4) chewable tab 8 tablet, 8 tablet, Oral, Q15 Min PRN  •  Insulin Aspart Pen (NOVOLOG) 100 UNIT/ML flexpen 1-7 Units, 1-7 Units, Subcutaneous, TID CC  •  0.9% NaCl infusion, , Intravenous, Continuous  •  acetaminophen (TYLENOL) tab 650 mg, 650 mg, O

## 2021-12-06 NOTE — SLP NOTE
ADULT VIDEOFLUOROSCOPIC SWALLOWING STUDY    Admission Date: 11/30/2021  Evaluation Date: 12/06/21  Radiologist: Dr. Cristel Ferguson    Pt is covid 23 +. PPE REQUIRED. THIS SLP WORE GOWN, GOGGLES, GLOVES, AND DROPLET MASK.  HANDS SANITIZED/WASHED UPON ENTRANCE/EX Thin Liquids):  Within Functional Limits  Triggered at: Base of tongue  Residue Severity, Location: Trace;Valleculae;Pyriform sinuses  Laryngeal Penetration: During the swallow;Transient (very shallow x1 with thin liquids by cup)  Tracheal Aspiration: None Min-mild oral residue remained with puree and solids. The pharyngeal response was timely triggering at the tongue base.   Mildly reduced base of tongue retraction resulted in trace to mild vallecular retention and intermittent trace pyriform sinus retentio

## 2021-12-06 NOTE — PLAN OF CARE
Problem: Patient Centered Care  Goal: Patient preferences are identified and integrated in the patient's plan of care  Description: Interventions:  - What would you like us to know as we care for you?  I live at home with my son   - Provide timely, comple arrhythmias  - Monitor electrolytes and administer replacement therapy as ordered  Outcome: Progressing     Problem: RESPIRATORY - ADULT  Goal: Achieves optimal ventilation and oxygenation  Description: INTERVENTIONS:  - Assess for changes in respiratory s of patient's pain. Tylenol administered as needed. Plan of care discussed with patient at the bedside. Verbalized understanding.

## 2021-12-06 NOTE — PLAN OF CARE
IV abx and IVF continued. Medicated for headache. Plan for video swallow.      Problem: Patient Centered Care  Goal: Patient preferences are identified and integrated in the patient's plan of care  Description: Interventions:  - What would you like us to kn medications as ordered  - Initiate emergency measures for life threatening arrhythmias  - Monitor electrolytes and administer replacement therapy as ordered  Outcome: Progressing     Problem: RESPIRATORY - ADULT  Goal: Achieves optimal ventilation and oxyg

## 2021-12-06 NOTE — OCCUPATIONAL THERAPY NOTE
OCCUPATIONAL THERAPY TREATMENT NOTE - INPATIENT        Room Number: 503/503-A           Presenting Problem: cough, SOB ; COVID    Problem List  Principal Problem:    COVID  Active Problems:    Elevated troponin      OCCUPATIONAL THERAPY ASSESSMENT   During None      PAIN ASSESSMENT              ACTIVITY TOLERANCE  See above    ACTIVITIES OF DAILY LIVING ASSESSMENT  AM-PAC ‘6-Clicks’ Inpatient Daily Activity Short Form  How much help from another person does the patient currently need…  -   Putting on and marisa

## 2021-12-06 NOTE — PHYSICAL THERAPY NOTE
PHYSICAL THERAPY TREATMENT NOTE - INPATIENT     Room Number: 503/503-A       Presenting Problem: COVID    Problem List  Principal Problem:    COVID  Active Problems:    Elevated troponin      PHYSICAL THERAPY ASSESSMENT   Chart reviewed.  BLAYNE ulloa PLAN  PT Treatment Plan: Body mechanics; Bed mobility; Patient education;Gait training;Balance training;Transfer training;Strengthening    SUBJECTIVE  \"I don't want to\"    OBJECTIVE  Precautions: None    WEIGHT BEARING RESTRICTION  Weight Bearing Restr tested    Goal #3 Patient is able to ambulate 10 feet with assist device: none at assistance level: minimum assistance   Goal #3   Current Status Not tested    Goal #4     Goal #4   Current Status     Goal #5 Patient to demonstrate independence with home a

## 2021-12-06 NOTE — PROGRESS NOTES
[facility]     Hospitalist Progress Note     Av Beckham Patient Status:  Inpatient    1945 MRN [id number]   Location [facility]5W Attending Holland Arevalo MD   Hosp Day # 6 PCP No primary care provider on file.      Chief 233.0 251.0       Recent Labs   Lab 12/03/21  0501 12/03/21  0501 12/04/21  1047 12/05/21  0506 12/06/21  0515   GLU 81   < > 102* 84 89   BUN 17   < > 16 15 18   CREATSERUM 0.88   < > 0.84 0.80 0.81   GFRAA 73   < > 77 82 80   GFRNAA 63   < > 67 71 70   C • montelukast  10 mg Oral Daily   • predniSONE  1 mg Oral BID with meals   • sucralfate  1 g Oral Daily   • piperacillin-tazobactam  3.375 g Intravenous Q8H   • Insulin Aspart Pen  1-7 Units Subcutaneous TID CC       Assessment & Plan:      COVID - 19 Pn workup    Plan of care discussed with patient or family at bedside. Anne Chandra MD  Hospitalist      Supplementary Documentation:     Quality:  · Diet: general modified.    · PT/OT: SNF  · DVT Prophylaxis: start heparin  · CODE status: Full   · Dispo

## 2021-12-06 NOTE — VASCULAR ACCESS
Vascular Access Consult Note  12/6/2021     Reviewed H&P and current condition.   Past Medical History:  No date: Stroke [facility])       Reviewed current medication list:    Intravenous Infusion:   • sodium chloride 40 mL/hr at 12/05/21 1330          methIMAzol PRN   Or  dextrose 50 % injection 50 mL, 50 mL, Intravenous, Q15 Min PRN   Or  glucose (DEX4) oral liquid 30 g, 30 g, Oral, Q15 Min PRN   Or  glucose-vitamin C (DEX-4) chewable tab 8 tablet, 8 tablet, Oral, Q15 Min PRN  Insulin Aspart Pen (NOVOLOG) 100 UNI

## 2021-12-07 ENCOUNTER — APPOINTMENT (OUTPATIENT)
Dept: GENERAL RADIOLOGY | Facility: HOSPITAL | Age: 76
DRG: 177 | End: 2021-12-07
Attending: HOSPITALIST
Payer: MEDICARE

## 2021-12-07 LAB
GLUCOSE BLDC GLUCOMTR-MCNC: 101 MG/DL (ref 70–99)
GLUCOSE BLDC GLUCOMTR-MCNC: 105 MG/DL (ref 70–99)
GLUCOSE BLDC GLUCOMTR-MCNC: 111 MG/DL (ref 70–99)
GLUCOSE BLDC GLUCOMTR-MCNC: 164 MG/DL (ref 70–99)

## 2021-12-07 PROCEDURE — 74240 X-RAY XM UPR GI TRC 1CNTRST: CPT | Performed by: HOSPITALIST

## 2021-12-07 PROCEDURE — 99233 SBSQ HOSP IP/OBS HIGH 50: CPT | Performed by: HOSPITALIST

## 2021-12-07 PROCEDURE — 74246 X-RAY XM UPR GI TRC 2CNTRST: CPT | Performed by: HOSPITALIST

## 2021-12-07 NOTE — PLAN OF CARE
Problem: Patient Centered Care  Goal: Patient preferences are identified and integrated in the patient's plan of care  Description: Interventions:  - What would you like us to know as we care for you?  I live at home with my son   - Provide timely, comple arrhythmias  - Monitor electrolytes and administer replacement therapy as ordered  Outcome: Progressing     Problem: RESPIRATORY - ADULT  Goal: Achieves optimal ventilation and oxygenation  Description: INTERVENTIONS:  - Assess for changes in respiratory s Patient verbalized understanding of plan of care.

## 2021-12-07 NOTE — PLAN OF CARE
Vital stable. Alert, on room air, HR tachy with ambulation and ambulates 1 assist with walker. IV abx continued. 1 loose stool over night. Plan for barium esophagram today. Call light and belonging within reach.     Problem: Patient Centered Care  Goal: Heaven Vaughn cardiac monitoring, monitor vital signs, obtain 12 lead EKG if indicated  - Evaluate effectiveness of antiarrhythmic and heart rate control medications as ordered  - Initiate emergency measures for life threatening arrhythmias  - Monitor electrolytes and a restrictions as appropriate  Outcome: Progressing

## 2021-12-07 NOTE — SPIRITUAL CARE NOTE
consulted with bedside RN. RN reported that Pt declined  visit at this time. Pt has Spiritual Care contact information. Narda Lopez will be available when pt needs a pastoral visit.     Marly Lopez  Resident   Ext. [phone]

## 2021-12-07 NOTE — SLP NOTE
SPEECH DAILY NOTE - INPATIENT    ASSESSMENT & PLAN   ASSESSMENT    PT IS COVID 19 +. PPE REQUIRED. THIS SLP WORE GLOVES, GOWN, FACE SHIELD AND DROPLET MASK OVER N95 MASK. HANDS SANITIZED/WASHED UPON ENTRANCE/EXIT. Pt alert, afebrile and on room air.  Pt session(s). No CSA (no cough, no throat clear, clear vocal quality and no SOB) on swallows of solid nor thin liquids  during this session for 100% of trials.       GOAL MET      Goal #2 The patient will utilize compensatory strategies as outlined by  VFS

## 2021-12-07 NOTE — PROGRESS NOTES
[facility]    Progress Note    Aniya Anton Patient Status:  Inpatient    1945 MRN [id number]   Location [facility]5W Attending Dotty Martinez, [address] Day # 7 PCP No primary care provider on file. Subjective:    Antonia Fermin g, 1 g, Oral, Daily  Piperacillin Sod-Tazobactam So (ZOSYN) 3.375 g in dextrose 5% 100 mL IVPB-MBP, 3.375 g, Intravenous, Q8H  glucose (DEX4) oral liquid 15 g, 15 g, Oral, Q15 Min PRN   Or  glucose-vitamin C (DEX-4) chewable tab 4 tablet, 4 tablet, Oral, Q Juan Ramon Armas MD on 12/07/2021 at 9:10 AM     Finalized by (CST):  Fredi Payne MD on 12/07/2021 at 9:12 AM                Results:     CBC:    Lab Results   Component Value Date    WBC 6.2 12/06/2021    WBC 7.0 12/05/2021    WBC 6.5 12/04/2021     Lab Result once acute episode resolves. - no signs of acute flare.   - cont Albuterol inhaler as needed.      H/o HFpEF  Abnormal troponin   Moderate pulm HTN      Chronic a.fib   - Euvolemic   - rpt ECHO reviewed EF normal. LA dilated, moder MVR.  Mild pulm HTN   -

## 2021-12-07 NOTE — PHYSICAL THERAPY NOTE
PHYSICAL THERAPY TREATMENT NOTE - INPATIENT     Room Number: 503/503-A       Presenting Problem: COVID       Problem List  Principal Problem:    COVID  Active Problems:    Elevated troponin      PHYSICAL THERAPY ASSESSMENT     Goggles, face shield, gloves, wheelchair, bedside commode, etc.): A Little   Patient Difficulty: Moving from lying on back to sitting on the side of the bed?: A Little   How much help from another person does the patient currently need. ..    Help from Another: Moving to and from a bed t

## 2021-12-08 ENCOUNTER — APPOINTMENT (OUTPATIENT)
Dept: ULTRASOUND IMAGING | Facility: HOSPITAL | Age: 76
DRG: 177 | End: 2021-12-08
Attending: HOSPITALIST
Payer: MEDICARE

## 2021-12-08 LAB
ANION GAP SERPL CALC-SCNC: 10 MMOL/L (ref 0–18)
BASOPHILS # BLD AUTO: 0.03 X10(3) UL (ref 0–0.2)
BASOPHILS NFR BLD AUTO: 0.5 %
BUN BLD-MCNC: 12 MG/DL (ref 7–18)
BUN/CREAT SERPL: 15.6 (ref 10–20)
CALCIUM BLD-MCNC: 8.5 MG/DL (ref 8.5–10.1)
CHLORIDE SERPL-SCNC: 115 MMOL/L (ref 98–112)
CO2 SERPL-SCNC: 20 MMOL/L (ref 21–32)
CREAT BLD-MCNC: 0.77 MG/DL
D DIMER PPP FEU-MCNC: 3.78 UG/ML FEU (ref ?–0.78)
DEPRECATED HBV CORE AB SER IA-ACNC: 232.8 NG/ML
DEPRECATED RDW RBC AUTO: 52.6 FL (ref 35.1–46.3)
EOSINOPHIL # BLD AUTO: 0.23 X10(3) UL (ref 0–0.7)
EOSINOPHIL NFR BLD AUTO: 3.5 %
ERYTHROCYTE [DISTWIDTH] IN BLOOD BY AUTOMATED COUNT: 17 % (ref 11–15)
GLUCOSE BLD-MCNC: 82 MG/DL (ref 70–99)
GLUCOSE BLDC GLUCOMTR-MCNC: 105 MG/DL (ref 70–99)
GLUCOSE BLDC GLUCOMTR-MCNC: 107 MG/DL (ref 70–99)
GLUCOSE BLDC GLUCOMTR-MCNC: 121 MG/DL (ref 70–99)
GLUCOSE BLDC GLUCOMTR-MCNC: 90 MG/DL (ref 70–99)
GLUCOSE BLDC GLUCOMTR-MCNC: 91 MG/DL (ref 70–99)
HCT VFR BLD AUTO: 35.1 %
HGB BLD-MCNC: 11.1 G/DL
IMM GRANULOCYTES # BLD AUTO: 0.09 X10(3) UL (ref 0–1)
IMM GRANULOCYTES NFR BLD: 1.4 %
LYMPHOCYTES # BLD AUTO: 2 X10(3) UL (ref 1–4)
LYMPHOCYTES NFR BLD AUTO: 30.3 %
MCH RBC QN AUTO: 26.7 PG (ref 26–34)
MCHC RBC AUTO-ENTMCNC: 31.6 G/DL (ref 31–37)
MCV RBC AUTO: 84.4 FL
MONOCYTES # BLD AUTO: 0.83 X10(3) UL (ref 0.1–1)
MONOCYTES NFR BLD AUTO: 12.6 %
NEUTROPHILS # BLD AUTO: 3.43 X10 (3) UL (ref 1.5–7.7)
NEUTROPHILS # BLD AUTO: 3.43 X10(3) UL (ref 1.5–7.7)
NEUTROPHILS NFR BLD AUTO: 51.7 %
OSMOLALITY SERPL CALC.SUM OF ELEC: 299 MOSM/KG (ref 275–295)
PLATELET # BLD AUTO: 295 10(3)UL (ref 150–450)
POTASSIUM SERPL-SCNC: 3.7 MMOL/L (ref 3.5–5.1)
RBC # BLD AUTO: 4.16 X10(6)UL
SODIUM SERPL-SCNC: 145 MMOL/L (ref 136–145)
WBC # BLD AUTO: 6.6 X10(3) UL (ref 4–11)

## 2021-12-08 PROCEDURE — 93971 EXTREMITY STUDY: CPT | Performed by: HOSPITALIST

## 2021-12-08 PROCEDURE — 99233 SBSQ HOSP IP/OBS HIGH 50: CPT | Performed by: HOSPITALIST

## 2021-12-08 NOTE — OCCUPATIONAL THERAPY NOTE
OCCUPATIONAL THERAPY TREATMENT NOTE - INPATIENT        Room Number: 503/503-A           Presenting Problem: cough, SOB ; COVID    Problem List  Principal Problem:    COVID  Active Problems:    Elevated troponin      OCCUPATIONAL THERAPY ASSESSMENT     PPE regular lower body clothing?: A Little  -   Bathing (including washing, rinsing, drying)?: A Lot  -   Toileting, which includes using toilet, bedpan or urinal? : A Lot  -   Putting on and taking off regular upper body clothing?: A Little  -   Taking care o

## 2021-12-08 NOTE — DIETARY NOTE
ADULT NUTRITION REASSESSMENT    Pt is at high nutrition risk. Pt meets severe malnutrition criteria.       CRITERIA FOR MALNUTRITION DIAGNOSIS:  Criteria for severe malnutrition diagnosis: chronic illness related to wt loss greater than 7.5% in 3 months an has much declined since last visit. Seen by SLP this AM for video swallow to rule out silent aspiration and rec ground diet with thin liquids. Pt reports feeling of food stuck in throat, and therefore does not eat.  Given decline in PO intake, (minimal for Oral QID   • polyethylene glycol (PEG 3350)  17 g Oral Daily   • sennosides  8.6 mg Oral Daily   • docusate sodium  100 mg Oral BID   • montelukast  10 mg Oral Daily   • predniSONE  1 mg Oral BID with meals   • sucralfate  1 g Oral Daily   • Insulin Aspart ESTIMATED NUTRITION NEEDS: Dosing wt: 58.6 kg  Calories: 0651-4612 calories/day (28-32 calories per kg Current wt)  Protein: 70-88 grams protein/day (1.2-1.5 grams protein per kg Current wt)    NUTRITION INTERVENTION:  - Diet: Orders Placed This Encoun nutrition status    [address] [facility], [address], [address] [facility], [address]

## 2021-12-08 NOTE — PLAN OF CARE
Problem: Diabetes/Glucose Control  Goal: Glucose maintained within prescribed range  Description: INTERVENTIONS:  - Monitor Blood Glucose as ordered  - Assess for signs and symptoms of hyperglycemia and hypoglycemia  - Administer ordered medications to [facility] target range  - Assess barriers to adequate nutritional intake and initiate nutrition consult as needed  - Instruct patient on self management of diabetes  Outcome: Progressing  Goal: Electrolytes maintained within normal limits  Description: INTERVENTIONS

## 2021-12-08 NOTE — PROGRESS NOTES
[facility] HOSP - [facility]    Progress Note    Vandana Uriostegui Patient Status:  Inpatient    1945 MRN [id number]   Location [facility]5W Attending Grant Faulkner   Hosp Day # 8 PCP No primary care provider on file.      Subjective: US VENOUS DOPPLER ARM RIGHT - DIAG IMG ([id number]=[id number])    Result Date: 12/8/2021  CONCLUSION:  1. No evidence of deep venous thrombosis in the right upper extremity. There is edema in the tissues of the right antecubital fossa.     Dictated by (CST): [employer] HFpEF  Abnormal troponin   Moderate pulm HTN        Chronic a.fib   - Euvolemic   - rpt ECHO reviewed EF normal. LA dilated, moder MVR. Mild pulm HTN   - check   - restart home meds. - pt currently not on AC.  Confirmed with her daughter that she w

## 2021-12-09 ENCOUNTER — APPOINTMENT (OUTPATIENT)
Dept: GENERAL RADIOLOGY | Facility: HOSPITAL | Age: 76
DRG: 177 | End: 2021-12-09
Attending: HOSPITALIST
Payer: MEDICARE

## 2021-12-09 LAB
ALBUMIN SERPL-MCNC: 2.1 G/DL (ref 3.4–5)
ALBUMIN/GLOB SERPL: 0.5 {RATIO} (ref 1–2)
ALP LIVER SERPL-CCNC: 61 U/L
ALT SERPL-CCNC: 11 U/L
AMPHET UR QL SCN: NEGATIVE
ANION GAP SERPL CALC-SCNC: 8 MMOL/L (ref 0–18)
AST SERPL-CCNC: 13 U/L (ref 15–37)
BARBITURATES UR QL SCN: NEGATIVE
BASOPHILS # BLD AUTO: 0.04 X10(3) UL (ref 0–0.2)
BASOPHILS NFR BLD AUTO: 0.5 %
BENZODIAZ UR QL SCN: NEGATIVE
BILIRUB SERPL-MCNC: 0.5 MG/DL (ref 0.1–2)
BUN BLD-MCNC: 11 MG/DL (ref 7–18)
BUN/CREAT SERPL: 13.9 (ref 10–20)
CALCIUM BLD-MCNC: 8.4 MG/DL (ref 8.5–10.1)
CANNABINOIDS UR QL SCN: NEGATIVE
CHLORIDE SERPL-SCNC: 117 MMOL/L (ref 98–112)
CO2 SERPL-SCNC: 19 MMOL/L (ref 21–32)
COCAINE UR QL: NEGATIVE
CREAT BLD-MCNC: 0.79 MG/DL
CREAT UR-SCNC: 109 MG/DL
DEPRECATED RDW RBC AUTO: 51.8 FL (ref 35.1–46.3)
EOSINOPHIL # BLD AUTO: 0.19 X10(3) UL (ref 0–0.7)
EOSINOPHIL NFR BLD AUTO: 2.6 %
ERYTHROCYTE [DISTWIDTH] IN BLOOD BY AUTOMATED COUNT: 16.8 % (ref 11–15)
GLOBULIN PLAS-MCNC: 4.1 G/DL (ref 2.8–4.4)
GLUCOSE BLD-MCNC: 83 MG/DL (ref 70–99)
GLUCOSE BLDC GLUCOMTR-MCNC: 110 MG/DL (ref 70–99)
GLUCOSE BLDC GLUCOMTR-MCNC: 126 MG/DL (ref 70–99)
GLUCOSE BLDC GLUCOMTR-MCNC: 127 MG/DL (ref 70–99)
GLUCOSE BLDC GLUCOMTR-MCNC: 79 MG/DL (ref 70–99)
HCT VFR BLD AUTO: 34.2 %
HGB BLD-MCNC: 10.8 G/DL
IMM GRANULOCYTES # BLD AUTO: 0.08 X10(3) UL (ref 0–1)
IMM GRANULOCYTES NFR BLD: 1.1 %
LYMPHOCYTES # BLD AUTO: 2.1 X10(3) UL (ref 1–4)
LYMPHOCYTES NFR BLD AUTO: 28.5 %
MAGNESIUM SERPL-MCNC: 1.9 MG/DL (ref 1.6–2.6)
MCH RBC QN AUTO: 26.5 PG (ref 26–34)
MCHC RBC AUTO-ENTMCNC: 31.6 G/DL (ref 31–37)
MCV RBC AUTO: 84 FL
MDMA UR QL SCN: NEGATIVE
METHADONE UR QL SCN: NEGATIVE
MONOCYTES # BLD AUTO: 1.05 X10(3) UL (ref 0.1–1)
MONOCYTES NFR BLD AUTO: 14.2 %
NEUTROPHILS # BLD AUTO: 3.91 X10 (3) UL (ref 1.5–7.7)
NEUTROPHILS # BLD AUTO: 3.91 X10(3) UL (ref 1.5–7.7)
NEUTROPHILS NFR BLD AUTO: 53.1 %
OPIATES UR QL SCN: NEGATIVE
OSMOLALITY SERPL CALC.SUM OF ELEC: 297 MOSM/KG (ref 275–295)
OXYCODONE UR QL SCN: NEGATIVE
PCP UR QL SCN: NEGATIVE
PHOSPHATE SERPL-MCNC: 4.1 MG/DL (ref 2.5–4.9)
PLATELET # BLD AUTO: 296 10(3)UL (ref 150–450)
POTASSIUM SERPL-SCNC: 4 MMOL/L (ref 3.5–5.1)
PROT SERPL-MCNC: 6.2 G/DL (ref 6.4–8.2)
RBC # BLD AUTO: 4.07 X10(6)UL
SODIUM SERPL-SCNC: 144 MMOL/L (ref 136–145)
WBC # BLD AUTO: 7.4 X10(3) UL (ref 4–11)

## 2021-12-09 PROCEDURE — 71045 X-RAY EXAM CHEST 1 VIEW: CPT | Performed by: HOSPITALIST

## 2021-12-09 PROCEDURE — 99233 SBSQ HOSP IP/OBS HIGH 50: CPT | Performed by: HOSPITALIST

## 2021-12-09 RX ORDER — PANTOPRAZOLE SODIUM 40 MG/1
40 TABLET, DELAYED RELEASE ORAL
Status: DISCONTINUED | OUTPATIENT
Start: 2021-12-09 | End: 2021-12-10

## 2021-12-09 NOTE — PHYSICAL THERAPY NOTE
PHYSICAL THERAPY TREATMENT NOTE - INPATIENT     Room Number: 503/503-A       Presenting Problem: COVID       Problem List  Principal Problem:    COVID  Active Problems:    Elevated troponin      PHYSICAL THERAPY ASSESSMENT     Goggles, face shield, gloves, +    ACTIVITY TOLERANCE                          O2 WALK       AM-PAC '6-Clicks' INPATIENT SHORT FORM - BASIC MOBILITY  How much difficulty does the patient currently have. ..   Patient Difficulty: Turning over in bed (including adjusting bedclothes, sheets discharge.    Goal #5   Current Status IN PROGRESS   Goal #6     Goal #6  Current Status

## 2021-12-09 NOTE — PROGRESS NOTES
[facility] - [facility]    Progress Note    Vilma Daly Patient Status:  Inpatient    1945 MRN [id number]   Location [facility]5W Attending Irina Faulkner   [address] Day # 9 PCP No primary care provider on file.      Subjective: PHOS 4.1 12/09/2021    TROP 0.069 (HH) 11/30/2021       US VENOUS DOPPLER ARM RIGHT - DIAG IMG (IGC=[id number])    Result Date: 12/8/2021  CONCLUSION:  1. No evidence of deep venous thrombosis in the right upper extremity.   There is edema in the tissues of t moder MVR. Mild pulm HTN   - check   - restart home meds. - pt currently not on AC. Confirmed with her daughter that she was on eliquis. Her cardiologist is Dr Rebekah Dalal in [facility]. She was told it was held for a procedure.  We are no longer doing bx

## 2021-12-09 NOTE — PROGRESS NOTES
PT made nurse aware that she found a pill bottle in patient's pillow and placed it on the counter in the room. This RN went in and took the pill bottle and paged Dr. Valerie Ramon to make him aware.  When RN looked into pill bottle, noted a white powder in the

## 2021-12-09 NOTE — PLAN OF CARE
Problem: Patient Centered Care  Goal: Patient preferences are identified and integrated in the patient's plan of care  Description: Interventions:  - What would you like us to know as we care for you?  I live at home with my son   - Provide timely, comple arrhythmias  - Monitor electrolytes and administer replacement therapy as ordered  Outcome: Progressing     Problem: RESPIRATORY - ADULT  Goal: Achieves optimal ventilation and oxygenation  Description: INTERVENTIONS:  - Assess for changes in respiratory s consumed  - Identify factors contributing to decreased intake, treat as appropriate  - Assist with meals as needed  - Monitor I&O, WT and lab values  - Obtain nutritional consult as needed  - Optimize oral hygiene and moisture  - Encourage food from home;

## 2021-12-09 NOTE — PROGRESS NOTES
[facility] Note: Route Optimization for Pantoprazole (PROTONIX)    Patient is currently on Pantoprazole (PROTONIX) 40 mg IV every 12 hours.    The patient meets the criteria to convert to the oral equivalent as established by the IV to Oral conversion pro

## 2021-12-09 NOTE — PLAN OF CARE
Dietician recommending PEG tube and initiation of tube feedings for supplementation due to patient's poor appetite and malnutrition. This RN spoke with patient who is fully aware and able to make decisions for herself.  After a long discussion about the ris

## 2021-12-09 NOTE — PLAN OF CARE
Problem: Patient Centered Care  Goal: Patient preferences are identified and integrated in the patient's plan of care  Description: Interventions:  - What would you like us to know as we care for you?  I live at home with my son   - Provide timely, comple 2008 by Conchita Titus RN  Outcome: Progressing     Problem: CARDIOVASCULAR - ADULT  Goal: Absence of cardiac arrhythmias or at baseline  Description: INTERVENTIONS:  - Continuous cardiac monitoring, monitor vital signs, obtain 12 lead EKG if indicated Progressing  12/8/2021 2008 by Laci Maharaj RN  Outcome: Progressing  Goal: Electrolytes maintained within normal limits  Description: INTERVENTIONS:  - Monitor labs and rhythm and assess patient for signs and symptoms of electrolyte imbalances  - Admi

## 2021-12-09 NOTE — PLAN OF CARE
Problem: Patient Centered Care  Goal: Patient preferences are identified and integrated in the patient's plan of care  Description: Interventions:  - What would you like us to know as we care for you?  I live at home with my son   - Provide timely, comple monitor O2.   - take remdesevir    - See additional Care Plan goals for specific interventions  Outcome: Progressing  Note: Upon assessment, patient's breathing was a bit labored with some grunting. Place her on 2L of O2. Removed after an hour.  Patient Timi Morales prescribed range  Description: INTERVENTIONS:  - Monitor Blood Glucose as ordered  - Assess for signs and symptoms of hyperglycemia and hypoglycemia  - Administer ordered medications to maintain glucose within target range  - Assess barriers to adequate nu

## 2021-12-10 VITALS
BODY MASS INDEX: 20.37 KG/M2 | HEART RATE: 87 BPM | TEMPERATURE: 98 F | HEIGHT: 67 IN | DIASTOLIC BLOOD PRESSURE: 72 MMHG | OXYGEN SATURATION: 99 % | RESPIRATION RATE: 18 BRPM | WEIGHT: 129.81 LBS | SYSTOLIC BLOOD PRESSURE: 123 MMHG

## 2021-12-10 LAB
ANION GAP SERPL CALC-SCNC: 5 MMOL/L (ref 0–18)
BASOPHILS # BLD AUTO: 0.03 X10(3) UL (ref 0–0.2)
BASOPHILS NFR BLD AUTO: 0.4 %
BUN BLD-MCNC: 12 MG/DL (ref 7–18)
BUN/CREAT SERPL: 14.3 (ref 10–20)
CALCIUM BLD-MCNC: 8.5 MG/DL (ref 8.5–10.1)
CHLORIDE SERPL-SCNC: 116 MMOL/L (ref 98–112)
CO2 SERPL-SCNC: 23 MMOL/L (ref 21–32)
CREAT BLD-MCNC: 0.84 MG/DL
DEPRECATED RDW RBC AUTO: 53.2 FL (ref 35.1–46.3)
EOSINOPHIL # BLD AUTO: 0.27 X10(3) UL (ref 0–0.7)
EOSINOPHIL NFR BLD AUTO: 3.7 %
ERYTHROCYTE [DISTWIDTH] IN BLOOD BY AUTOMATED COUNT: 17.2 % (ref 11–15)
GLUCOSE BLD-MCNC: 119 MG/DL (ref 70–99)
GLUCOSE BLDC GLUCOMTR-MCNC: 105 MG/DL (ref 70–99)
GLUCOSE BLDC GLUCOMTR-MCNC: 91 MG/DL (ref 70–99)
HCT VFR BLD AUTO: 34 %
HGB BLD-MCNC: 10.6 G/DL
IMM GRANULOCYTES # BLD AUTO: 0.07 X10(3) UL (ref 0–1)
IMM GRANULOCYTES NFR BLD: 1 %
LYMPHOCYTES # BLD AUTO: 2.3 X10(3) UL (ref 1–4)
LYMPHOCYTES NFR BLD AUTO: 31.6 %
MAGNESIUM SERPL-MCNC: 1.6 MG/DL (ref 1.6–2.6)
MCH RBC QN AUTO: 26.5 PG (ref 26–34)
MCHC RBC AUTO-ENTMCNC: 31.2 G/DL (ref 31–37)
MCV RBC AUTO: 85 FL
MONOCYTES # BLD AUTO: 0.9 X10(3) UL (ref 0.1–1)
MONOCYTES NFR BLD AUTO: 12.3 %
NEUTROPHILS # BLD AUTO: 3.72 X10 (3) UL (ref 1.5–7.7)
NEUTROPHILS # BLD AUTO: 3.72 X10(3) UL (ref 1.5–7.7)
NEUTROPHILS NFR BLD AUTO: 51 %
OSMOLALITY SERPL CALC.SUM OF ELEC: 299 MOSM/KG (ref 275–295)
PLATELET # BLD AUTO: 309 10(3)UL (ref 150–450)
POTASSIUM SERPL-SCNC: 3.8 MMOL/L (ref 3.5–5.1)
RBC # BLD AUTO: 4 X10(6)UL
SODIUM SERPL-SCNC: 144 MMOL/L (ref 136–145)
WBC # BLD AUTO: 7.3 X10(3) UL (ref 4–11)

## 2021-12-10 PROCEDURE — 99239 HOSP IP/OBS DSCHRG MGMT >30: CPT | Performed by: HOSPITALIST

## 2021-12-10 RX ORDER — CALCIUM CARBONATE 200(500)MG
2 TABLET,CHEWABLE ORAL 3 TIMES DAILY PRN
Qty: 90 TABLET | Refills: 0 | Status: SHIPPED | OUTPATIENT
Start: 2021-12-10

## 2021-12-10 RX ORDER — ACETAMINOPHEN 325 MG/1
650 TABLET ORAL EVERY 6 HOURS PRN
Qty: 1 TABLET | Refills: 0 | Status: SHIPPED | COMMUNITY
Start: 2021-12-10

## 2021-12-10 RX ORDER — PSEUDOEPHEDRINE HCL 30 MG
100 TABLET ORAL 2 TIMES DAILY
Qty: 30 CAPSULE | Refills: 0 | Status: SHIPPED | OUTPATIENT
Start: 2021-12-10

## 2021-12-10 RX ORDER — SUCRALFATE 1 G/1
1 TABLET ORAL
Qty: 120 TABLET | Refills: 0 | Status: SHIPPED | OUTPATIENT
Start: 2021-12-10

## 2021-12-10 RX ORDER — POLYETHYLENE GLYCOL 3350 17 G/17G
17 POWDER, FOR SOLUTION ORAL DAILY
Qty: 30 EACH | Refills: 0 | Status: SHIPPED | OUTPATIENT
Start: 2021-12-11

## 2021-12-10 RX ORDER — BENZONATATE 100 MG/1
100 CAPSULE ORAL 3 TIMES DAILY PRN
Qty: 30 CAPSULE | Refills: 0 | Status: SHIPPED | OUTPATIENT
Start: 2021-12-10

## 2021-12-10 RX ORDER — PANTOPRAZOLE SODIUM 40 MG/1
40 TABLET, DELAYED RELEASE ORAL
Qty: 60 TABLET | Refills: 0 | Status: SHIPPED | OUTPATIENT
Start: 2021-12-10

## 2021-12-10 RX ORDER — SENNA PLUS 8.6 MG/1
8.6 TABLET ORAL DAILY
Qty: 30 TABLET | Refills: 0 | Status: SHIPPED | OUTPATIENT
Start: 2021-12-11

## 2021-12-10 RX ORDER — DIGOXIN 125 MCG
125 TABLET ORAL DAILY
Status: DISCONTINUED | OUTPATIENT
Start: 2021-12-10 | End: 2021-12-10

## 2021-12-10 RX ORDER — METHIMAZOLE 10 MG/1
10 TABLET ORAL 3 TIMES DAILY
Qty: 90 TABLET | Refills: 0 | Status: SHIPPED | OUTPATIENT
Start: 2021-12-10

## 2021-12-10 NOTE — PLAN OF CARE
Problem: Patient Centered Care  Goal: Patient preferences are identified and integrated in the patient's plan of care  Description: Interventions:  - What would you like us to know as we care for you?  I live at home with my son   - Provide timely, comple emergency measures for life threatening arrhythmias  - Monitor electrolytes and administer replacement therapy as ordered  Outcome: Adequate for Discharge     Problem: RESPIRATORY - ADULT  Goal: Achieves optimal ventilation and oxygenation  Description: IN (undernourished)  Description: INTERVENTIONS:  - Monitor percentage of each meal consumed  - Identify factors contributing to decreased intake, treat as appropriate  - Assist with meals as needed  - Monitor I&O, WT and lab values  - Obtain nutritional cons

## 2021-12-10 NOTE — DISCHARGE SUMMARY
Dc summary#[id number]  > 30 min spent on [facility] Discharge Diagnoses: covid pneumonia    Lace+ Score: 59  59-90 High Risk  29-58 Medium Risk  0-28   Low Risk. TCM Follow-Up Recommendation:  LACE > 58:  High Risk of readmission after discharge from t

## 2021-12-10 NOTE — PLAN OF CARE
Problem: Patient Centered Care  Goal: Patient preferences are identified and integrated in the patient's plan of care  Description: Interventions:  - What would you like us to know as we care for you?  I live at home with my son   - Provide timely, comple Patient's Short Term Goal: decrease SOB    Interventions:   - monitor O2.   - take remdesevir    - See additional Care Plan goals for specific interventions  Outcome: Progressing  Note: Patient denies SOB. Continue to be on RA.  Will continue to monitor within target range  - Assess barriers to adequate nutritional intake and initiate nutrition consult as needed  - Instruct patient on self management of diabetes  Outcome: Progressing  Note: Blood sugars monitoring maintained.  Patient slightly improving on

## 2021-12-10 NOTE — DIETARY NOTE
Brief Nutrition Update Note    Update 12/10/21:  Pt seen for follow up. Pt intake mildly improving. Noted staff found pill bottle filled with white powder under pillow yesterday. Per MD note, baking soda?  Per PCT, pt reports she uses this for her acid refl

## 2021-12-12 NOTE — DISCHARGE SUMMARY
[facility]    PATIENT'S NAME: Ryan Vaughnchavez   ATTENDING PHYSICIAN: Thalia Faulkner MD   PATIENT ACCOUNT#:   [id number]    LOCATION:  [place] [address] RECORD #:   [id number]       YOB: 1945  ADMISSION DATE:       11/30/2021 tox screen was negative and hopefully she will do well in therapy. I instructed her son that she needs to be coached to eat and I asked her rehab facility to spend a lot of time and patience coaching her to eat in order for her to thrive.   She had no blee patient does not want any G-tube at all. She flatly refused. Discussed with her son and we were able to discharge her to rehab. CONDITION ON DISCHARGE:  Stable.     CODE STATUS:  Full code which was discussed with her daughter during this hospitalizati

## 2021-12-14 ENCOUNTER — SNF ADMIT/H&P (OUTPATIENT)
Dept: INTERNAL MEDICINE CLINIC | Facility: SKILLED NURSING FACILITY | Age: 76
End: 2021-12-14

## 2021-12-14 DIAGNOSIS — Z71.89 ENCOUNTER FOR MEDICATION REVIEW AND COUNSELING: ICD-10-CM

## 2021-12-14 DIAGNOSIS — U07.1 COVID: ICD-10-CM

## 2021-12-14 DIAGNOSIS — K59.00 CONSTIPATION, UNSPECIFIED CONSTIPATION TYPE: ICD-10-CM

## 2021-12-14 DIAGNOSIS — Z51.89 ENCOUNTER FOR MEDICATION ADJUSTMENT: ICD-10-CM

## 2021-12-14 DIAGNOSIS — E16.2 HYPOGLYCEMIA: ICD-10-CM

## 2021-12-14 DIAGNOSIS — R05.8 COUGH PRODUCTIVE OF CLEAR SPUTUM: ICD-10-CM

## 2021-12-14 PROCEDURE — 1111F DSCHRG MED/CURRENT MED MERGE: CPT | Performed by: NURSE PRACTITIONER

## 2021-12-14 PROCEDURE — 99310 SBSQ NF CARE HIGH MDM 45: CPT | Performed by: NURSE PRACTITIONER

## 2021-12-14 NOTE — PROGRESS NOTES
Janna Rodney  : 1945  Age 64year old  female patient is admitted to [facility] for VIANCA. Reason For Visit:Initial Assessment/Follow up COVID-19 Infection and AMS. [address] [place] [address] Admission -12/10/2021.     HPI  Patient is a 63 y/o poor appetite. Past Medical History:   Diagnosis Date   • Stroke [facility])    COPD  Chronic elevated troponin   Left ventricular diastolic dysfunction with moderate pulmonary hypertension  Chronic A. Fib  CKD  Borderline DM Type 2  GERD  Hyperthyroidism  B prior to discharge. ASSESSMENT/PLAN:  PLAN   -Reviewed labs,meds and vital signs.   -CBC/CMP/CRP/D-dimer weekly X 4 weeks, wasn't done 12/13, re-ordered for 12/15.  -Discontinue  Humalog insulin SS as patient isn't eating well and BS levels are in low - Monitor    Hyperthyroidism  - CPM,methimazole  - Re-check TFTs in 6 weeks    H/o Breast CA-stable    Other medications  -CPM, Ca Carbonate TID PRN. -CPM, Tylenol PRN.                     Barrett Vaughan, APRN  12/14/2021      45 total minutes spent

## 2021-12-21 PROCEDURE — 99309 SBSQ NF CARE MODERATE MDM 30: CPT | Performed by: NURSE PRACTITIONER

## 2021-12-24 ENCOUNTER — SNF VISIT (OUTPATIENT)
Dept: INTERNAL MEDICINE CLINIC | Facility: SKILLED NURSING FACILITY | Age: 76
End: 2021-12-24

## 2021-12-24 DIAGNOSIS — R63.0 POOR APPETITE: ICD-10-CM

## 2021-12-24 DIAGNOSIS — R05.9 COUGH: ICD-10-CM

## 2021-12-24 DIAGNOSIS — Z09 FOLLOW UP: ICD-10-CM

## 2021-12-24 DIAGNOSIS — Z79.899 ENCOUNTER FOR MEDICATION REVIEW: ICD-10-CM

## 2021-12-24 DIAGNOSIS — R79.89 ELEVATED D-DIMER: ICD-10-CM

## 2021-12-24 DIAGNOSIS — R53.1 GENERALIZED WEAKNESS: ICD-10-CM

## 2021-12-24 NOTE — PROGRESS NOTES
Janna Ozunampf  : 1945  Age 64year old  female patient is admitted to [facility] for VIANCA. Reason For Visit:Follow up COVID-19 Infection and AMS. Follow up elevated D-dimer,poor appetite,and generalized weakness.   [address] [place] [address] Type 2  GERD  Hyperthyroidism  Breast CA      No past surgical history on file. No family history on file.   Social History    Tobacco Use      Smoking status: Former Smoker      Smokeless tobacco: Never Used    Alcohol use: Not on file    Drug use: Not on Na 139, K 5.9, BUN 12, D-dimer 1541, CRP 42.5.  12/15 wbc 5.85, H&H 10.4 33.2, , Na 141, K 3.4, BUN 12, Crt 0.6, Ca 6.7, D-dimer 79.     Generalized weakness/Poor appetite  -Had IVF, 0.9NS at 60ml/hr on 12/15  -Doing better today and eating well, den and Connie  - Follows with  outpatient   - Monitor    Hyperthyroidism  - CPM,methimazole  - Re-check TFTs in 6 weeks    H/o Breast CA-stable  -Out-patient oncology  -hx Left breast mastectomy. Other medications  -CPM, Ca Carbonate TID PRN.   -CP

## 2022-07-22 ENCOUNTER — HOSPITAL ENCOUNTER (INPATIENT)
Facility: HOSPITAL | Age: 77
LOS: 2 days | Discharge: HOME HEALTH | End: 2022-07-25
Admitting: HOSPITALIST
Payer: MEDICARE

## 2022-07-22 ENCOUNTER — APPOINTMENT (OUTPATIENT)
Dept: GENERAL RADIOLOGY | Facility: HOSPITAL | Age: 77
End: 2022-07-22
Payer: MEDICARE

## 2022-07-22 DIAGNOSIS — R06.02 SHORTNESS OF BREATH: ICD-10-CM

## 2022-07-22 DIAGNOSIS — N17.9 AKI (ACUTE KIDNEY INJURY) (HCC): Primary | ICD-10-CM

## 2022-07-22 PROCEDURE — 71045 X-RAY EXAM CHEST 1 VIEW: CPT

## 2022-07-23 ENCOUNTER — APPOINTMENT (OUTPATIENT)
Dept: ULTRASOUND IMAGING | Facility: HOSPITAL | Age: 77
End: 2022-07-23
Attending: HOSPITALIST
Payer: MEDICARE

## 2022-07-23 ENCOUNTER — APPOINTMENT (OUTPATIENT)
Dept: CT IMAGING | Facility: HOSPITAL | Age: 77
End: 2022-07-23
Attending: HOSPITALIST
Payer: MEDICARE

## 2022-07-23 PROBLEM — R06.02 SHORTNESS OF BREATH: Status: ACTIVE | Noted: 2022-07-23

## 2022-07-23 PROBLEM — N17.9 AKI (ACUTE KIDNEY INJURY) (HCC): Status: ACTIVE | Noted: 2022-07-23

## 2022-07-23 LAB
ANION GAP SERPL CALC-SCNC: 8 MMOL/L (ref 0–18)
ANION GAP SERPL CALC-SCNC: 9 MMOL/L (ref 0–18)
BASOPHILS # BLD AUTO: 0.06 X10(3) UL (ref 0–0.2)
BASOPHILS # BLD AUTO: 0.06 X10(3) UL (ref 0–0.2)
BASOPHILS NFR BLD AUTO: 0.7 %
BASOPHILS NFR BLD AUTO: 0.8 %
BILIRUB UR QL: NEGATIVE
BUN BLD-MCNC: 52 MG/DL (ref 7–18)
BUN BLD-MCNC: 57 MG/DL (ref 7–18)
BUN/CREAT SERPL: 32 (ref 10–20)
BUN/CREAT SERPL: 34.4 (ref 10–20)
CALCIUM BLD-MCNC: 8.6 MG/DL (ref 8.5–10.1)
CALCIUM BLD-MCNC: 9.8 MG/DL (ref 8.5–10.1)
CHLORIDE SERPL-SCNC: 102 MMOL/L (ref 98–112)
CHLORIDE SERPL-SCNC: 106 MMOL/L (ref 98–112)
CO2 SERPL-SCNC: 26 MMOL/L (ref 21–32)
CO2 SERPL-SCNC: 28 MMOL/L (ref 21–32)
COLOR UR: YELLOW
CREAT BLD-MCNC: 1.51 MG/DL
CREAT BLD-MCNC: 1.78 MG/DL
DEPRECATED HBV CORE AB SER IA-ACNC: 123.7 NG/ML
DEPRECATED RDW RBC AUTO: 44.1 FL (ref 35.1–46.3)
DEPRECATED RDW RBC AUTO: 45.2 FL (ref 35.1–46.3)
EOSINOPHIL # BLD AUTO: 0.54 X10(3) UL (ref 0–0.7)
EOSINOPHIL # BLD AUTO: 0.56 X10(3) UL (ref 0–0.7)
EOSINOPHIL NFR BLD AUTO: 6.9 %
EOSINOPHIL NFR BLD AUTO: 7 %
ERYTHROCYTE [DISTWIDTH] IN BLOOD BY AUTOMATED COUNT: 14.3 % (ref 11–15)
ERYTHROCYTE [DISTWIDTH] IN BLOOD BY AUTOMATED COUNT: 14.4 % (ref 11–15)
EST. AVERAGE GLUCOSE BLD GHB EST-MCNC: 163 MG/DL (ref 68–126)
GLUCOSE BLD-MCNC: 105 MG/DL (ref 70–99)
GLUCOSE BLD-MCNC: 113 MG/DL (ref 70–99)
GLUCOSE BLDC GLUCOMTR-MCNC: 116 MG/DL (ref 70–99)
GLUCOSE BLDC GLUCOMTR-MCNC: 130 MG/DL (ref 70–99)
GLUCOSE BLDC GLUCOMTR-MCNC: 78 MG/DL (ref 70–99)
GLUCOSE BLDC GLUCOMTR-MCNC: 83 MG/DL (ref 70–99)
GLUCOSE BLDC GLUCOMTR-MCNC: 94 MG/DL (ref 70–99)
GLUCOSE UR-MCNC: NEGATIVE MG/DL
HBA1C MFR BLD: 7.3 % (ref ?–5.7)
HCT VFR BLD AUTO: 38.8 %
HCT VFR BLD AUTO: 45.1 %
HEMOCCULT STL QL: NEGATIVE
HGB BLD-MCNC: 11.9 G/DL
HGB BLD-MCNC: 13.7 G/DL
HGB UR QL STRIP.AUTO: NEGATIVE
HYALINE CASTS #/AREA URNS AUTO: PRESENT /LPF
IMM GRANULOCYTES # BLD AUTO: 0.05 X10(3) UL (ref 0–1)
IMM GRANULOCYTES # BLD AUTO: 0.05 X10(3) UL (ref 0–1)
IMM GRANULOCYTES NFR BLD: 0.6 %
IMM GRANULOCYTES NFR BLD: 0.6 %
IRON SATN MFR SERPL: 12 %
IRON SERPL-MCNC: 31 UG/DL
KETONES UR-MCNC: NEGATIVE MG/DL
LYMPHOCYTES # BLD AUTO: 2.42 X10(3) UL (ref 1–4)
LYMPHOCYTES # BLD AUTO: 2.62 X10(3) UL (ref 1–4)
LYMPHOCYTES NFR BLD AUTO: 29.7 %
LYMPHOCYTES NFR BLD AUTO: 33.9 %
MCH RBC QN AUTO: 26 PG (ref 26–34)
MCH RBC QN AUTO: 26.1 PG (ref 26–34)
MCHC RBC AUTO-ENTMCNC: 30.4 G/DL (ref 31–37)
MCHC RBC AUTO-ENTMCNC: 30.7 G/DL (ref 31–37)
MCV RBC AUTO: 85.1 FL
MCV RBC AUTO: 85.7 FL
MONOCYTES # BLD AUTO: 0.42 X10(3) UL (ref 0.1–1)
MONOCYTES # BLD AUTO: 0.56 X10(3) UL (ref 0.1–1)
MONOCYTES NFR BLD AUTO: 5.2 %
MONOCYTES NFR BLD AUTO: 7.3 %
NEUTROPHILS # BLD AUTO: 3.89 X10 (3) UL (ref 1.5–7.7)
NEUTROPHILS # BLD AUTO: 3.89 X10(3) UL (ref 1.5–7.7)
NEUTROPHILS # BLD AUTO: 4.64 X10 (3) UL (ref 1.5–7.7)
NEUTROPHILS # BLD AUTO: 4.64 X10(3) UL (ref 1.5–7.7)
NEUTROPHILS NFR BLD AUTO: 50.4 %
NEUTROPHILS NFR BLD AUTO: 56.9 %
NITRITE UR QL STRIP.AUTO: NEGATIVE
OSMOLALITY SERPL CALC.SUM OF ELEC: 304 MOSM/KG (ref 275–295)
OSMOLALITY SERPL CALC.SUM OF ELEC: 305 MOSM/KG (ref 275–295)
PH UR: 6 [PH] (ref 5–8)
PLATELET # BLD AUTO: 313 10(3)UL (ref 150–450)
PLATELET # BLD AUTO: 357 10(3)UL (ref 150–450)
POTASSIUM SERPL-SCNC: 4.1 MMOL/L (ref 3.5–5.1)
POTASSIUM SERPL-SCNC: 5.2 MMOL/L (ref 3.5–5.1)
PROT UR-MCNC: NEGATIVE MG/DL
RBC # BLD AUTO: 4.56 X10(6)UL
RBC # BLD AUTO: 5.26 X10(6)UL
SARS-COV-2 RNA RESP QL NAA+PROBE: NOT DETECTED
SODIUM SERPL-SCNC: 139 MMOL/L (ref 136–145)
SODIUM SERPL-SCNC: 140 MMOL/L (ref 136–145)
SP GR UR STRIP: 1.01 (ref 1–1.03)
T3FREE SERPL-MCNC: 2.1 PG/ML (ref 2.4–4.2)
T4 FREE SERPL-MCNC: 1.7 NG/DL (ref 0.8–1.7)
TIBC SERPL-MCNC: 256 UG/DL (ref 240–450)
TRANSFERRIN SERPL-MCNC: 172 MG/DL (ref 200–360)
TROPONIN I HIGH SENSITIVITY: 14 NG/L
TSI SER-ACNC: 0.01 MIU/ML (ref 0.36–3.74)
UROBILINOGEN UR STRIP-ACNC: <2
VIT B12 SERPL-MCNC: 495 PG/ML (ref 193–986)
VIT C UR-MCNC: NEGATIVE MG/DL
WBC # BLD AUTO: 7.7 X10(3) UL (ref 4–11)
WBC # BLD AUTO: 8.2 X10(3) UL (ref 4–11)

## 2022-07-23 PROCEDURE — 99222 1ST HOSP IP/OBS MODERATE 55: CPT | Performed by: HOSPITALIST

## 2022-07-23 PROCEDURE — 76775 US EXAM ABDO BACK WALL LIM: CPT | Performed by: HOSPITALIST

## 2022-07-23 PROCEDURE — 70450 CT HEAD/BRAIN W/O DYE: CPT | Performed by: HOSPITALIST

## 2022-07-23 RX ORDER — ACETAMINOPHEN 325 MG/1
650 TABLET ORAL EVERY 6 HOURS PRN
Status: DISCONTINUED | OUTPATIENT
Start: 2022-07-23 | End: 2022-07-25

## 2022-07-23 RX ORDER — AMIODARONE HYDROCHLORIDE 200 MG/1
200 TABLET ORAL DAILY
COMMUNITY
Start: 2022-07-21

## 2022-07-23 RX ORDER — METHIMAZOLE 10 MG/1
10 TABLET ORAL 3 TIMES DAILY
Status: DISCONTINUED | OUTPATIENT
Start: 2022-07-23 | End: 2022-07-23

## 2022-07-23 RX ORDER — METOPROLOL TARTRATE 50 MG/1
50 TABLET, FILM COATED ORAL 2 TIMES DAILY
Status: DISCONTINUED | OUTPATIENT
Start: 2022-07-23 | End: 2022-07-25

## 2022-07-23 RX ORDER — SENNOSIDES 8.6 MG
8.6 TABLET ORAL DAILY
Status: DISCONTINUED | OUTPATIENT
Start: 2022-07-23 | End: 2022-07-25

## 2022-07-23 RX ORDER — PANTOPRAZOLE SODIUM 40 MG/1
40 TABLET, DELAYED RELEASE ORAL
Status: DISCONTINUED | OUTPATIENT
Start: 2022-07-23 | End: 2022-07-25

## 2022-07-23 RX ORDER — SODIUM CHLORIDE 450 MG/100ML
INJECTION, SOLUTION INTRAVENOUS CONTINUOUS
Status: DISCONTINUED | OUTPATIENT
Start: 2022-07-23 | End: 2022-07-25

## 2022-07-23 RX ORDER — METHIMAZOLE 5 MG/1
5 TABLET ORAL
COMMUNITY
Start: 2022-07-21

## 2022-07-23 RX ORDER — ISOSORBIDE MONONITRATE 30 MG/1
30 TABLET, EXTENDED RELEASE ORAL EVERY MORNING
COMMUNITY
Start: 2022-07-21 | End: 2022-07-25

## 2022-07-23 RX ORDER — AMIODARONE HYDROCHLORIDE 200 MG/1
200 TABLET ORAL DAILY
Status: DISCONTINUED | OUTPATIENT
Start: 2022-07-23 | End: 2022-07-25

## 2022-07-23 RX ORDER — METHIMAZOLE 5 MG/1
5 TABLET ORAL
Status: DISCONTINUED | OUTPATIENT
Start: 2022-07-23 | End: 2022-07-25

## 2022-07-23 RX ORDER — ALBUTEROL SULFATE 90 UG/1
2 AEROSOL, METERED RESPIRATORY (INHALATION) EVERY 6 HOURS PRN
Status: DISCONTINUED | OUTPATIENT
Start: 2022-07-23 | End: 2022-07-25

## 2022-07-23 RX ORDER — SUCRALFATE 1 G/1
1 TABLET ORAL
Status: DISCONTINUED | OUTPATIENT
Start: 2022-07-23 | End: 2022-07-25

## 2022-07-23 RX ORDER — HEPARIN SODIUM 5000 [USP'U]/ML
5000 INJECTION, SOLUTION INTRAVENOUS; SUBCUTANEOUS EVERY 12 HOURS SCHEDULED
Status: DISCONTINUED | OUTPATIENT
Start: 2022-07-23 | End: 2022-07-23

## 2022-07-23 RX ORDER — ONDANSETRON 2 MG/ML
4 INJECTION INTRAMUSCULAR; INTRAVENOUS EVERY 6 HOURS PRN
Status: DISCONTINUED | OUTPATIENT
Start: 2022-07-23 | End: 2022-07-25

## 2022-07-23 RX ORDER — SPIRONOLACTONE 25 MG/1
25 TABLET ORAL DAILY
COMMUNITY
Start: 2022-06-03 | End: 2022-07-25

## 2022-07-23 RX ORDER — POLYETHYLENE GLYCOL 3350 17 G/17G
17 POWDER, FOR SOLUTION ORAL DAILY
Status: DISCONTINUED | OUTPATIENT
Start: 2022-07-23 | End: 2022-07-25

## 2022-07-23 NOTE — ED NOTES
Orders for admission, patient is aware of plan and ready to go upstairs. Any questions, please call ED RN Sofiya Michael  at extension [phone].    Type of COVID test sent:rapid  COVID Suspicion level: Low    Titratable drug(s) infusin.9  Rate:bolus    LOC at time of transport:x4    Other pertinent information:    Weak, fall risk    CIWA score=n/a  NIH score=n/a

## 2022-07-23 NOTE — PLAN OF CARE
Admission completed, med rec unable to complete d/t patient's not knowing her medication, confused, and poor historian. Patient is alert & oriented x3, can be confused at times, delay response d/t hx of stroke, on RA. Heparin subQ for dvt prophylaxis. Regular/general diet, having low appetite, swallowing okay. ACHS completed overnight. Left arm precaution. No complaints of pain or nausea overnight. Patient has not have void or gotten up during shift since arrived onto floor for admission. IVF infusing with 0.45 at 125 ml/hr. Plan pending. Problem: Patient Centered Care  Goal: Patient preferences are identified and integrated in the patient's plan of care  Description: Interventions:  - What would you like us to know as we care for you?  From home with son and grandson  - Provide timely, complete, and accurate information to patient/family  - Incorporate patient and family knowledge, values, beliefs, and cultural backgrounds into the planning and delivery of care  - Encourage patient/family to participate in care and decision-making at the level they choose  - Honor patient and family perspectives and choices  Outcome: Progressing     Problem: Patient/Family Goals  Goal: Patient/Family Long Term Goal  Description: Patient's Long Term Goal: To discharge home    Interventions:  -Monitor labs, results, and vitals  -Administer medications as prescribed  -Prevent infection  -Pain management  -Follow plan of care  - See additional Care Plan goals for specific interventions  Outcome: Progressing  Goal: Patient/Family Short Term Goal  Description: Patient's Short Term Goal: Manage current condition    Interventions:   -Follow plan of care  -Administer medication as prescribed  -Monitor labs  - See additional Care Plan goals for specific interventions  Outcome: Progressing     Problem: CARDIOVASCULAR - ADULT  Goal: Maintains optimal cardiac output and hemodynamic stability  Description: INTERVENTIONS:  - Monitor vital signs, rhythm, and trends  - Monitor for bleeding, hypotension and signs of decreased cardiac output  - Evaluate effectiveness of vasoactive medications to optimize hemodynamic stability  - Monitor arterial and/or venous puncture sites for bleeding and/or hematoma  - Assess quality of pulses, skin color and temperature  - Assess for signs of decreased coronary artery perfusion - ex.  Angina  - Evaluate fluid balance, assess for edema, trend weights  Outcome: Progressing  Goal: Absence of cardiac arrhythmias or at baseline  Description: INTERVENTIONS:  - Continuous cardiac monitoring, monitor vital signs, obtain 12 lead EKG if indicated  - Evaluate effectiveness of antiarrhythmic and heart rate control medications as ordered  - Initiate emergency measures for life threatening arrhythmias  - Monitor electrolytes and administer replacement therapy as ordered  Outcome: Progressing     Problem: RESPIRATORY - ADULT  Goal: Achieves optimal ventilation and oxygenation  Description: INTERVENTIONS:  - Assess for changes in respiratory status  - Assess for changes in mentation and behavior  - Position to facilitate oxygenation and minimize respiratory effort  - Oxygen supplementation based on oxygen saturation or ABGs  - Provide Smoking Cessation handout, if applicable  - Encourage broncho-pulmonary hygiene including cough, deep breathe, Incentive Spirometry  - Assess the need for suctioning and perform as needed  - Assess and instruct to report SOB or any respiratory difficulty  - Respiratory Therapy support as indicated  - Manage/alleviate anxiety  - Monitor for signs/symptoms of CO2 retention  Outcome: Progressing     Problem: PAIN - ADULT  Goal: Verbalizes/displays adequate comfort level or patient's stated pain goal  Description: INTERVENTIONS:  - Encourage pt to monitor pain and request assistance  - Assess pain using appropriate pain scale  - Administer analgesics based on type and severity of pain and evaluate response  - Implement non-pharmacological measures as appropriate and evaluate response  - Consider cultural and social influences on pain and pain management  - Manage/alleviate anxiety  - Utilize distraction and/or relaxation techniques  - Monitor for opioid side effects  - Notify MD/LIP if interventions unsuccessful or patient reports new pain  - Anticipate increased pain with activity and pre-medicate as appropriate  Outcome: Progressing     Problem: SAFETY ADULT - FALL  Goal: Free from fall injury  Description: INTERVENTIONS:  - Assess pt frequently for physical needs  - Identify cognitive and physical deficits and behaviors that affect risk of falls.   - [place] fall precautions as indicated by assessment.  - Educate pt/family on patient safety including physical limitations  - Instruct pt to call for assistance with activity based on assessment  - Modify environment to reduce risk of injury  - Provide assistive devices as appropriate  - Consider OT/PT consult to assist with strengthening/mobility  - Encourage toileting schedule  Outcome: Progressing

## 2022-07-23 NOTE — ED INITIAL ASSESSMENT (HPI)
Patient presents to ER with complaints of difficulty breathing x 1 week. Notes saw MD yesterday, supposed to check lungs for CA?

## 2022-07-24 LAB
ALBUMIN SERPL-MCNC: 2.6 G/DL (ref 3.4–5)
ALBUMIN/GLOB SERPL: 0.6 {RATIO} (ref 1–2)
ALP LIVER SERPL-CCNC: 75 U/L
ALT SERPL-CCNC: 10 U/L
ANION GAP SERPL CALC-SCNC: 5 MMOL/L (ref 0–18)
AST SERPL-CCNC: 16 U/L (ref 15–37)
BASOPHILS # BLD AUTO: 0.06 X10(3) UL (ref 0–0.2)
BASOPHILS NFR BLD AUTO: 0.9 %
BILIRUB SERPL-MCNC: 0.5 MG/DL (ref 0.1–2)
BUN BLD-MCNC: 27 MG/DL (ref 7–18)
BUN/CREAT SERPL: 24.1 (ref 10–20)
CALCIUM BLD-MCNC: 8.8 MG/DL (ref 8.5–10.1)
CHLORIDE SERPL-SCNC: 109 MMOL/L (ref 98–112)
CO2 SERPL-SCNC: 25 MMOL/L (ref 21–32)
CREAT BLD-MCNC: 1.12 MG/DL
DEPRECATED RDW RBC AUTO: 45 FL (ref 35.1–46.3)
EOSINOPHIL # BLD AUTO: 0.49 X10(3) UL (ref 0–0.7)
EOSINOPHIL NFR BLD AUTO: 7.7 %
ERYTHROCYTE [DISTWIDTH] IN BLOOD BY AUTOMATED COUNT: 14.3 % (ref 11–15)
GLOBULIN PLAS-MCNC: 4.7 G/DL (ref 2.8–4.4)
GLUCOSE BLD-MCNC: 85 MG/DL (ref 70–99)
GLUCOSE BLDC GLUCOMTR-MCNC: 131 MG/DL (ref 70–99)
GLUCOSE BLDC GLUCOMTR-MCNC: 84 MG/DL (ref 70–99)
GLUCOSE BLDC GLUCOMTR-MCNC: 93 MG/DL (ref 70–99)
GLUCOSE BLDC GLUCOMTR-MCNC: 98 MG/DL (ref 70–99)
HCT VFR BLD AUTO: 38.9 %
HGB BLD-MCNC: 12 G/DL
IMM GRANULOCYTES # BLD AUTO: 0.02 X10(3) UL (ref 0–1)
IMM GRANULOCYTES NFR BLD: 0.3 %
LYMPHOCYTES # BLD AUTO: 2.13 X10(3) UL (ref 1–4)
LYMPHOCYTES NFR BLD AUTO: 33.3 %
MAGNESIUM SERPL-MCNC: 2.1 MG/DL (ref 1.6–2.6)
MCH RBC QN AUTO: 26.5 PG (ref 26–34)
MCHC RBC AUTO-ENTMCNC: 30.8 G/DL (ref 31–37)
MCV RBC AUTO: 85.9 FL
MONOCYTES # BLD AUTO: 0.52 X10(3) UL (ref 0.1–1)
MONOCYTES NFR BLD AUTO: 8.1 %
NEUTROPHILS # BLD AUTO: 3.17 X10 (3) UL (ref 1.5–7.7)
NEUTROPHILS # BLD AUTO: 3.17 X10(3) UL (ref 1.5–7.7)
NEUTROPHILS NFR BLD AUTO: 49.7 %
OSMOLALITY SERPL CALC.SUM OF ELEC: 292 MOSM/KG (ref 275–295)
PHOSPHATE SERPL-MCNC: 2.4 MG/DL (ref 2.5–4.9)
PLATELET # BLD AUTO: 284 10(3)UL (ref 150–450)
POTASSIUM SERPL-SCNC: 4.4 MMOL/L (ref 3.5–5.1)
PROT SERPL-MCNC: 7.3 G/DL (ref 6.4–8.2)
RBC # BLD AUTO: 4.53 X10(6)UL
SODIUM SERPL-SCNC: 139 MMOL/L (ref 136–145)
WBC # BLD AUTO: 6.4 X10(3) UL (ref 4–11)

## 2022-07-24 PROCEDURE — 99233 SBSQ HOSP IP/OBS HIGH 50: CPT | Performed by: HOSPITALIST

## 2022-07-24 NOTE — PLAN OF CARE
Patient is alert, confused at times, delay response d/t hx stroke, poor historian. Getting Eliquis. On RA, 1L O2 via NC if needed for oxygen support, using inhaler PRN. Poor appetite, diet modified to ground mince, moist, nectar thick liquid. Painful to swallow, managed with Nystatin PO. Takes pills crush with pudding. Had BM overnight. ACHS completed overnight. Voiding, up to bathroom, 1x-assist stand-by with walker. Generalized weakness, some difficulty walking d/t hx of CVA. L arm precaution. IVF infusing with 0.45 at 83 ml/hr. Occult blood pending results. Plan pending. Problem: Patient Centered Care  Goal: Patient preferences are identified and integrated in the patient's plan of care  Description: Interventions:  - What would you like us to know as we care for you?  From home with son and grandson  - Provide timely, complete, and accurate information to patient/family  - Incorporate patient and family knowledge, values, beliefs, and cultural backgrounds into the planning and delivery of care  - Encourage patient/family to participate in care and decision-making at the level they choose  - Honor patient and family perspectives and choices  Outcome: Progressing     Problem: Patient/Family Goals  Goal: Patient/Family Long Term Goal  Description: Patient's Long Term Goal: To discharge home    Interventions:  -Monitor labs, results, and vitals  -Administer medications as prescribed  -Prevent infection  -Pain management  -Follow plan of care  - See additional Care Plan goals for specific interventions  Outcome: Progressing  Goal: Patient/Family Short Term Goal  Description: Patient's Short Term Goal: Manage current condition    Interventions:   -Follow plan of care  -Administer medication as prescribed  -Monitor labs  - See additional Care Plan goals for specific interventions  Outcome: Progressing     Problem: CARDIOVASCULAR - ADULT  Goal: Maintains optimal cardiac output and hemodynamic stability  Description: INTERVENTIONS:  - Monitor vital signs, rhythm, and trends  - Monitor for bleeding, hypotension and signs of decreased cardiac output  - Evaluate effectiveness of vasoactive medications to optimize hemodynamic stability  - Monitor arterial and/or venous puncture sites for bleeding and/or hematoma  - Assess quality of pulses, skin color and temperature  - Assess for signs of decreased coronary artery perfusion - ex.  Angina  - Evaluate fluid balance, assess for edema, trend weights  Outcome: Progressing  Goal: Absence of cardiac arrhythmias or at baseline  Description: INTERVENTIONS:  - Continuous cardiac monitoring, monitor vital signs, obtain 12 lead EKG if indicated  - Evaluate effectiveness of antiarrhythmic and heart rate control medications as ordered  - Initiate emergency measures for life threatening arrhythmias  - Monitor electrolytes and administer replacement therapy as ordered  Outcome: Progressing     Problem: RESPIRATORY - ADULT  Goal: Achieves optimal ventilation and oxygenation  Description: INTERVENTIONS:  - Assess for changes in respiratory status  - Assess for changes in mentation and behavior  - Position to facilitate oxygenation and minimize respiratory effort  - Oxygen supplementation based on oxygen saturation or ABGs  - Provide Smoking Cessation handout, if applicable  - Encourage broncho-pulmonary hygiene including cough, deep breathe, Incentive Spirometry  - Assess the need for suctioning and perform as needed  - Assess and instruct to report SOB or any respiratory difficulty  - Respiratory Therapy support as indicated  - Manage/alleviate anxiety  - Monitor for signs/symptoms of CO2 retention  Outcome: Progressing     Problem: PAIN - ADULT  Goal: Verbalizes/displays adequate comfort level or patient's stated pain goal  Description: INTERVENTIONS:  - Encourage pt to monitor pain and request assistance  - Assess pain using appropriate pain scale  - Administer analgesics based on type and severity of pain and evaluate response  - Implement non-pharmacological measures as appropriate and evaluate response  - Consider cultural and social influences on pain and pain management  - Manage/alleviate anxiety  - Utilize distraction and/or relaxation techniques  - Monitor for opioid side effects  - Notify MD/LIP if interventions unsuccessful or patient reports new pain  - Anticipate increased pain with activity and pre-medicate as appropriate  Outcome: Progressing     Problem: SAFETY ADULT - FALL  Goal: Free from fall injury  Description: INTERVENTIONS:  - Assess pt frequently for physical needs  - Identify cognitive and physical deficits and behaviors that affect risk of falls.   - [place] fall precautions as indicated by assessment.  - Educate pt/family on patient safety including physical limitations  - Instruct pt to call for assistance with activity based on assessment  - Modify environment to reduce risk of injury  - Provide assistive devices as appropriate  - Consider OT/PT consult to assist with strengthening/mobility  - Encourage toileting schedule  Outcome: Progressing     Problem: GASTROINTESTINAL - ADULT  Goal: Maintains adequate nutritional intake (undernourished)  Description: INTERVENTIONS:  - Monitor percentage of each meal consumed  - Identify factors contributing to decreased intake, treat as appropriate  - Assist with meals as needed  - Monitor I&O, WT and lab values  - Obtain nutritional consult as needed  - Optimize oral hygiene and moisture  - Encourage food from home; allow for food preferences  - Enhance eating environment  Outcome: Progressing     Problem: METABOLIC/FLUID AND ELECTROLYTES - ADULT  Goal: Glucose maintained within prescribed range  Description: INTERVENTIONS:  - Monitor Blood Glucose as ordered  - Assess for signs and symptoms of hyperglycemia and hypoglycemia  - Administer ordered medications to maintain glucose within target range  - Assess barriers to adequate nutritional intake and initiate nutrition consult as needed  - Instruct patient on self management of diabetes  Outcome: Progressing  Goal: Hemodynamic stability and optimal renal function maintained  Description: INTERVENTIONS:  - Monitor labs and assess for signs and symptoms of volume excess or deficit  - Monitor intake, output and patient weight  - Monitor urine specific gravity, serum osmolarity and serum sodium as indicated or ordered  - Monitor response to interventions for patient's volume status, including labs, urine output, blood pressure (other measures as available)  - Encourage oral intake as appropriate  - Instruct patient on fluid and nutrition restrictions as appropriate  Outcome: Progressing

## 2022-07-24 NOTE — PLAN OF CARE
A/O x 2-3, confused/forgetful at times, poor historian. Pleasant and cooperative. VSS, denies feeling SOB, oxygen saturations >95% on room air. Tolerating ground minced/moist diet with nectar thick liquids, no dysphagia noted, however pt has poor PO intake related to texture consistency stating \"I don't like this stuff, it's too thick\". Accuchecks monitored ACHS, WNL. Medications given crushed in applesauce. Walking stand-by assist with walker. IVF infusing. Bed in lowest position, call light in reach, fall precautions in place.

## 2022-07-25 VITALS
WEIGHT: 130.81 LBS | RESPIRATION RATE: 18 BRPM | OXYGEN SATURATION: 100 % | SYSTOLIC BLOOD PRESSURE: 130 MMHG | HEIGHT: 67 IN | HEART RATE: 78 BPM | BODY MASS INDEX: 20.53 KG/M2 | TEMPERATURE: 98 F | DIASTOLIC BLOOD PRESSURE: 112 MMHG

## 2022-07-25 LAB
ANION GAP SERPL CALC-SCNC: 7 MMOL/L (ref 0–18)
BASOPHILS # BLD AUTO: 0.07 X10(3) UL (ref 0–0.2)
BASOPHILS NFR BLD AUTO: 1.1 %
BUN BLD-MCNC: 18 MG/DL (ref 7–18)
BUN/CREAT SERPL: 16.4 (ref 10–20)
CALCIUM BLD-MCNC: 9.3 MG/DL (ref 8.5–10.1)
CHLORIDE SERPL-SCNC: 112 MMOL/L (ref 98–112)
CO2 SERPL-SCNC: 24 MMOL/L (ref 21–32)
CREAT BLD-MCNC: 1.1 MG/DL
DEPRECATED RDW RBC AUTO: 45.4 FL (ref 35.1–46.3)
EOSINOPHIL # BLD AUTO: 0.53 X10(3) UL (ref 0–0.7)
EOSINOPHIL NFR BLD AUTO: 8.1 %
ERYTHROCYTE [DISTWIDTH] IN BLOOD BY AUTOMATED COUNT: 14.3 % (ref 11–15)
GLUCOSE BLD-MCNC: 91 MG/DL (ref 70–99)
GLUCOSE BLDC GLUCOMTR-MCNC: 116 MG/DL (ref 70–99)
GLUCOSE BLDC GLUCOMTR-MCNC: 118 MG/DL (ref 70–99)
HCT VFR BLD AUTO: 37.2 %
HGB BLD-MCNC: 11.5 G/DL
IMM GRANULOCYTES # BLD AUTO: 0.03 X10(3) UL (ref 0–1)
IMM GRANULOCYTES NFR BLD: 0.5 %
LYMPHOCYTES # BLD AUTO: 2.29 X10(3) UL (ref 1–4)
LYMPHOCYTES NFR BLD AUTO: 34.9 %
MCH RBC QN AUTO: 26.7 PG (ref 26–34)
MCHC RBC AUTO-ENTMCNC: 30.9 G/DL (ref 31–37)
MCV RBC AUTO: 86.3 FL
MONOCYTES # BLD AUTO: 0.57 X10(3) UL (ref 0.1–1)
MONOCYTES NFR BLD AUTO: 8.7 %
NEUTROPHILS # BLD AUTO: 3.07 X10 (3) UL (ref 1.5–7.7)
NEUTROPHILS # BLD AUTO: 3.07 X10(3) UL (ref 1.5–7.7)
NEUTROPHILS NFR BLD AUTO: 46.7 %
OSMOLALITY SERPL CALC.SUM OF ELEC: 297 MOSM/KG (ref 275–295)
PHOSPHATE SERPL-MCNC: 2.9 MG/DL (ref 2.5–4.9)
PLATELET # BLD AUTO: 290 10(3)UL (ref 150–450)
POTASSIUM SERPL-SCNC: 4.4 MMOL/L (ref 3.5–5.1)
RBC # BLD AUTO: 4.31 X10(6)UL
SODIUM SERPL-SCNC: 143 MMOL/L (ref 136–145)
T PALLIDUM AB SER QL: NEGATIVE
WBC # BLD AUTO: 6.6 X10(3) UL (ref 4–11)

## 2022-07-25 PROCEDURE — 99233 SBSQ HOSP IP/OBS HIGH 50: CPT | Performed by: HOSPITALIST

## 2022-07-25 RX ORDER — SENNA PLUS 8.6 MG/1
1 TABLET ORAL DAILY
Qty: 30 TABLET | Refills: 0 | Status: SHIPPED | OUTPATIENT
Start: 2022-07-25

## 2022-07-25 RX ORDER — POLYETHYLENE GLYCOL 3350 17 G/17G
17 POWDER, FOR SOLUTION ORAL DAILY
Qty: 30 EACH | Refills: 0 | Status: SHIPPED | OUTPATIENT
Start: 2022-07-25

## 2022-07-25 RX ORDER — IPRATROPIUM BROMIDE AND ALBUTEROL SULFATE 2.5; .5 MG/3ML; MG/3ML
3 SOLUTION RESPIRATORY (INHALATION) EVERY 6 HOURS PRN
Status: DISCONTINUED | OUTPATIENT
Start: 2022-07-25 | End: 2022-07-25

## 2022-07-25 NOTE — PLAN OF CARE
Discharge orders received from medical, cardiology. Discharge instructions printed and discussed with patient and granddaughter. Prescriptions sent to pharmacy. Follow up appointments discussed. All questions answered. Patient discharged home with home health, stable upon discharge. Problem: Patient Centered Care  Goal: Patient preferences are identified and integrated in the patient's plan of care  Description: Interventions:  - What would you like us to know as we care for you?  From home with son and grandson  - Provide timely, complete, and accurate information to patient/family  - Incorporate patient and family knowledge, values, beliefs, and cultural backgrounds into the planning and delivery of care  - Encourage patient/family to participate in care and decision-making at the level they choose  - Honor patient and family perspectives and choices  7/25/2022 1641 by Anoop Alcantara RN  Outcome: Adequate for Discharge  7/25/2022 1606 by Anoop Alcantara RN  Outcome: Progressing     Problem: Patient/Family Goals  Goal: Patient/Family Long Term Goal  Description: Patient's Long Term Goal: To discharge home    Interventions:  -Monitor labs, results, and vitals  -Administer medications as prescribed  -Prevent infection  -Pain management  -Follow plan of care  - See additional Care Plan goals for specific interventions  7/25/2022 1641 by Anoop Alcantara RN  Outcome: Adequate for Discharge  7/25/2022 1606 by Anoop Alcantara RN  Outcome: Progressing  Goal: Patient/Family Short Term Goal  Description: Patient's Short Term Goal: Manage current condition    Interventions:   -Follow plan of care  -Administer medication as prescribed  -Monitor labs  - See additional Care Plan goals for specific interventions  7/25/2022 1641 by Anoop Alcantara RN  Outcome: Adequate for Discharge  7/25/2022 1606 by Anoop Alcantara RN  Outcome: Progressing     Problem: CARDIOVASCULAR - ADULT  Goal: Maintains optimal cardiac output and hemodynamic stability  Description: INTERVENTIONS:  - Monitor vital signs, rhythm, and trends  - Monitor for bleeding, hypotension and signs of decreased cardiac output  - Evaluate effectiveness of vasoactive medications to optimize hemodynamic stability  - Monitor arterial and/or venous puncture sites for bleeding and/or hematoma  - Assess quality of pulses, skin color and temperature  - Assess for signs of decreased coronary artery perfusion - ex.  Angina  - Evaluate fluid balance, assess for edema, trend weights  7/25/2022 1641 by Minerva Sevilla RN  Outcome: Adequate for Discharge  7/25/2022 1606 by Minerva Sevilla RN  Outcome: Progressing  Goal: Absence of cardiac arrhythmias or at baseline  Description: INTERVENTIONS:  - Continuous cardiac monitoring, monitor vital signs, obtain 12 lead EKG if indicated  - Evaluate effectiveness of antiarrhythmic and heart rate control medications as ordered  - Initiate emergency measures for life threatening arrhythmias  - Monitor electrolytes and administer replacement therapy as ordered  7/25/2022 1641 by Minerva Sevilla RN  Outcome: Adequate for Discharge  7/25/2022 1606 by Minerva Sevilla RN  Outcome: Progressing     Problem: RESPIRATORY - ADULT  Goal: Achieves optimal ventilation and oxygenation  Description: INTERVENTIONS:  - Assess for changes in respiratory status  - Assess for changes in mentation and behavior  - Position to facilitate oxygenation and minimize respiratory effort  - Oxygen supplementation based on oxygen saturation or ABGs  - Provide Smoking Cessation handout, if applicable  - Encourage broncho-pulmonary hygiene including cough, deep breathe, Incentive Spirometry  - Assess the need for suctioning and perform as needed  - Assess and instruct to report SOB or any respiratory difficulty  - Respiratory Therapy support as indicated  - Manage/alleviate anxiety  - Monitor for signs/symptoms of CO2 retention  7/25/2022 1641 by Minerva Sevilla RN  Outcome: Adequate for Discharge  7/25/2022 1606 by Anoop Alcantara RN  Outcome: Progressing     Problem: PAIN - ADULT  Goal: Verbalizes/displays adequate comfort level or patient's stated pain goal  Description: INTERVENTIONS:  - Encourage pt to monitor pain and request assistance  - Assess pain using appropriate pain scale  - Administer analgesics based on type and severity of pain and evaluate response  - Implement non-pharmacological measures as appropriate and evaluate response  - Consider cultural and social influences on pain and pain management  - Manage/alleviate anxiety  - Utilize distraction and/or relaxation techniques  - Monitor for opioid side effects  - Notify MD/LIP if interventions unsuccessful or patient reports new pain  - Anticipate increased pain with activity and pre-medicate as appropriate  7/25/2022 1641 by Anoop Alcantara RN  Outcome: Adequate for Discharge  7/25/2022 1606 by Anoop Alcantara RN  Outcome: Progressing     Problem: SAFETY ADULT - FALL  Goal: Free from fall injury  Description: INTERVENTIONS:  - Assess pt frequently for physical needs  - Identify cognitive and physical deficits and behaviors that affect risk of falls.   - [place] fall precautions as indicated by assessment.  - Educate pt/family on patient safety including physical limitations  - Instruct pt to call for assistance with activity based on assessment  - Modify environment to reduce risk of injury  - Provide assistive devices as appropriate  - Consider OT/PT consult to assist with strengthening/mobility  - Encourage toileting schedule  7/25/2022 1641 by Anoop Alcantara RN  Outcome: Adequate for Discharge  7/25/2022 1606 by Anoop Alcantara RN  Outcome: Progressing     Problem: DISCHARGE PLANNING  Goal: Discharge to home or other facility with appropriate resources  Description: INTERVENTIONS:  - Identify barriers to discharge w/pt and caregiver  - Include patient/family/discharge partner in discharge planning  - Arrange for needed discharge resources and transportation as appropriate  - Identify discharge learning needs (meds, wound care, etc)  - Arrange for interpreters to assist at discharge as needed  - Consider post-discharge preferences of patient/family/discharge partner  - Complete POLST form as appropriate  - Assess patient's ability to be responsible for managing their own health  - Refer to Case Management Department for coordinating discharge planning if the patient needs post-hospital services based on physician/LIP order or complex needs related to functional status, cognitive ability or social support system  7/25/2022 1641 by Geri You RN  Outcome: Adequate for Discharge  7/25/2022 1606 by Geri You RN  Outcome: Progressing     Problem: GASTROINTESTINAL - ADULT  Goal: Maintains adequate nutritional intake (undernourished)  Description: INTERVENTIONS:  - Monitor percentage of each meal consumed  - Identify factors contributing to decreased intake, treat as appropriate  - Assist with meals as needed  - Monitor I&O, WT and lab values  - Obtain nutritional consult as needed  - Optimize oral hygiene and moisture  - Encourage food from home; allow for food preferences  - Enhance eating environment  7/25/2022 1641 by Geri You RN  Outcome: Adequate for Discharge  7/25/2022 1606 by Geri You RN  Outcome: Progressing     Problem: METABOLIC/FLUID AND ELECTROLYTES - ADULT  Goal: Glucose maintained within prescribed range  Description: INTERVENTIONS:  - Monitor Blood Glucose as ordered  - Assess for signs and symptoms of hyperglycemia and hypoglycemia  - Administer ordered medications to maintain glucose within target range  - Assess barriers to adequate nutritional intake and initiate nutrition consult as needed  - Instruct patient on self management of diabetes  7/25/2022 1641 by Grei You RN  Outcome: Adequate for Discharge  7/25/2022 1606 by Geri You RN  Outcome: Progressing  Goal: Hemodynamic stability and optimal renal function maintained  Description: INTERVENTIONS:  - Monitor labs and assess for signs and symptoms of volume excess or deficit  - Monitor intake, output and patient weight  - Monitor urine specific gravity, serum osmolarity and serum sodium as indicated or ordered  - Monitor response to interventions for patient's volume status, including labs, urine output, blood pressure (other measures as available)  - Encourage oral intake as appropriate  - Instruct patient on fluid and nutrition restrictions as appropriate  7/25/2022 1641 by Lisy Londono RN  Outcome: Adequate for Discharge  7/25/2022 1606 by Lisy Londono, RN  Outcome: Progressing

## 2022-07-25 NOTE — PLAN OF CARE
Pt A/O X3. Tylenol for pain. Denies shortness of breath overnight. Room air. IVF running. Ground and minced diet with nectar thick liquids. Pt has poor appetite because she does not like the food. Encouraged oral intake. Pt was able to finish two cups of pudding overnight. Up with SB and RW. Call light within each. Safety precautions in place. Problem: Patient Centered Care  Goal: Patient preferences are identified and integrated in the patient's plan of care  Description: Interventions:  - What would you like us to know as we care for you?  From home with son and grandson  - Provide timely, complete, and accurate information to patient/family  - Incorporate patient and family knowledge, values, beliefs, and cultural backgrounds into the planning and delivery of care  - Encourage patient/family to participate in care and decision-making at the level they choose  - Honor patient and family perspectives and choices  Outcome: Progressing     Problem: Patient/Family Goals  Goal: Patient/Family Long Term Goal  Description: Patient's Long Term Goal: To discharge home    Interventions:  -Monitor labs, results, and vitals  -Administer medications as prescribed  -Prevent infection  -Pain management  -Follow plan of care  - See additional Care Plan goals for specific interventions  Outcome: Progressing  Goal: Patient/Family Short Term Goal  Description: Patient's Short Term Goal: Manage current condition    Interventions:   -Follow plan of care  -Administer medication as prescribed  -Monitor labs  - See additional Care Plan goals for specific interventions  Outcome: Progressing     Problem: CARDIOVASCULAR - ADULT  Goal: Maintains optimal cardiac output and hemodynamic stability  Description: INTERVENTIONS:  - Monitor vital signs, rhythm, and trends  - Monitor for bleeding, hypotension and signs of decreased cardiac output  - Evaluate effectiveness of vasoactive medications to optimize hemodynamic stability  - Monitor arterial and/or venous puncture sites for bleeding and/or hematoma  - Assess quality of pulses, skin color and temperature  - Assess for signs of decreased coronary artery perfusion - ex.  Angina  - Evaluate fluid balance, assess for edema, trend weights  Outcome: Progressing  Goal: Absence of cardiac arrhythmias or at baseline  Description: INTERVENTIONS:  - Continuous cardiac monitoring, monitor vital signs, obtain 12 lead EKG if indicated  - Evaluate effectiveness of antiarrhythmic and heart rate control medications as ordered  - Initiate emergency measures for life threatening arrhythmias  - Monitor electrolytes and administer replacement therapy as ordered  Outcome: Progressing     Problem: RESPIRATORY - ADULT  Goal: Achieves optimal ventilation and oxygenation  Description: INTERVENTIONS:  - Assess for changes in respiratory status  - Assess for changes in mentation and behavior  - Position to facilitate oxygenation and minimize respiratory effort  - Oxygen supplementation based on oxygen saturation or ABGs  - Provide Smoking Cessation handout, if applicable  - Encourage broncho-pulmonary hygiene including cough, deep breathe, Incentive Spirometry  - Assess the need for suctioning and perform as needed  - Assess and instruct to report SOB or any respiratory difficulty  - Respiratory Therapy support as indicated  - Manage/alleviate anxiety  - Monitor for signs/symptoms of CO2 retention  Outcome: Progressing     Problem: PAIN - ADULT  Goal: Verbalizes/displays adequate comfort level or patient's stated pain goal  Description: INTERVENTIONS:  - Encourage pt to monitor pain and request assistance  - Assess pain using appropriate pain scale  - Administer analgesics based on type and severity of pain and evaluate response  - Implement non-pharmacological measures as appropriate and evaluate response  - Consider cultural and social influences on pain and pain management  - Manage/alleviate anxiety  - Utilize distraction and/or relaxation techniques  - Monitor for opioid side effects  - Notify MD/LIP if interventions unsuccessful or patient reports new pain  - Anticipate increased pain with activity and pre-medicate as appropriate  Outcome: Progressing     Problem: SAFETY ADULT - FALL  Goal: Free from fall injury  Description: INTERVENTIONS:  - Assess pt frequently for physical needs  - Identify cognitive and physical deficits and behaviors that affect risk of falls.   - [place] fall precautions as indicated by assessment.  - Educate pt/family on patient safety including physical limitations  - Instruct pt to call for assistance with activity based on assessment  - Modify environment to reduce risk of injury  - Provide assistive devices as appropriate  - Consider OT/PT consult to assist with strengthening/mobility  - Encourage toileting schedule  Outcome: Progressing     Problem: DISCHARGE PLANNING  Goal: Discharge to home or other facility with appropriate resources  Description: INTERVENTIONS:  - Identify barriers to discharge w/pt and caregiver  - Include patient/family/discharge partner in discharge planning  - Arrange for needed discharge resources and transportation as appropriate  - Identify discharge learning needs (meds, wound care, etc)  - Arrange for interpreters to assist at discharge as needed  - Consider post-discharge preferences of patient/family/discharge partner  - Complete POLST form as appropriate  - Assess patient's ability to be responsible for managing their own health  - Refer to Case Management Department for coordinating discharge planning if the patient needs post-hospital services based on physician/LIP order or complex needs related to functional status, cognitive ability or social support system  Outcome: Progressing     Problem: GASTROINTESTINAL - ADULT  Goal: Maintains adequate nutritional intake (undernourished)  Description: INTERVENTIONS:  - Monitor percentage of each meal consumed  - Identify factors contributing to decreased intake, treat as appropriate  - Assist with meals as needed  - Monitor I&O, WT and lab values  - Obtain nutritional consult as needed  - Optimize oral hygiene and moisture  - Encourage food from home; allow for food preferences  - Enhance eating environment  Outcome: Progressing     Problem: METABOLIC/FLUID AND ELECTROLYTES - ADULT  Goal: Glucose maintained within prescribed range  Description: INTERVENTIONS:  - Monitor Blood Glucose as ordered  - Assess for signs and symptoms of hyperglycemia and hypoglycemia  - Administer ordered medications to maintain glucose within target range  - Assess barriers to adequate nutritional intake and initiate nutrition consult as needed  - Instruct patient on self management of diabetes  Outcome: Progressing  Goal: Hemodynamic stability and optimal renal function maintained  Description: INTERVENTIONS:  - Monitor labs and assess for signs and symptoms of volume excess or deficit  - Monitor intake, output and patient weight  - Monitor urine specific gravity, serum osmolarity and serum sodium as indicated or ordered  - Monitor response to interventions for patient's volume status, including labs, urine output, blood pressure (other measures as available)  - Encourage oral intake as appropriate  - Instruct patient on fluid and nutrition restrictions as appropriate  Outcome: Progressing

## 2022-07-25 NOTE — CM/SW NOTE
Patient is current with [facility] services. Resumption of care order placed. 314pm  To assess for further needs, SW Called and LVM for daughter, Jennifer Candelaria [phone] requesting a call back. Called for son, Fadia Fragoso [phone], no answer and unable to LVM. Called son, Aide Noble [phone], no answer and unable to LVM. PLAN: Return home with [facility], [address] placed and manually faxed to agency [phone]. The are aware of [facility] Dumont, [facility]    [id number]

## 2022-07-25 NOTE — CM/SW NOTE
BPCI Bundled Advanced Medicare Program    Plan of care reviewed for care coordination and discharge planning. Noted pt falls under  BPCI/Medicare program, with , W for Renal Failure    [address] Proposal:  Home Health pending progress    Plan: Home w/[facility] pending medical clearance. / to remain available for support and/or discharge planning.      LYNDA Rivera    [phone]

## (undated) NOTE — IP AVS SNAPSHOT
[facility]            (For Outpatient Use Only) Initial Admit Date: 11/30/2021   Inpt/Obs Admit Date: Inpt: 11/30/21 / Obs: N/A   Discharge Date:    Zoran Mejia:  [de-identified]   MRN: [de-identified]   CSN: [id number]   CEID: [id number]        E Subscriber:    Hospital Account Financial Class: Medicare    December 10, 2021

## (undated) NOTE — IP AVS SNAPSHOT
Patient Demographics     Address  [facility] [address] Phone  [phone] [facility]) *Preferred*  [phone] [facility])      Emergency Contact(s)     Name Relation Home Work ArabellaBarbNegin Son   [phone]    Emelina Romero Daughter   7 sennosides 8.6 MG Tabs  Commonly known as: SENOKOT  Take 1 tablet (8.6 mg total) by mouth daily.   Start taking on: December 11, 2021        CHANGE how you take these medications    methIMAzole 10 MG Tabs  Commonly known as: TAPAZOLE  Take 1 tablet (10 mg [place] [address]  [phone]             Saúl Almaraz PA-C In 6 weeks. Specialties: Physician Assistant, GASTROENTEROLOGY  Why: fu egd  Contact information:  [address]  [phone]             Sonja Harmon PA-C In 2 weeks. Marzena Schmidt MD         methIMAzole 10 MG Tabs  Commonly known as: TAPAZOLE  Next dose due: Tonight       Take 1 tablet (10 mg total) by mouth 3 (three) times daily.    Marzena Schmidt MD         metoprolol tartrate 50 MG Tabs  Commonly k antacid 500 MG Chew  docusate sodium 100 MG Caps  Insulin Aspart Pen 100 UNIT/ML Sopn  methIMAzole 10 MG Tabs  nystatin 867423 UNIT/ML Susp  pantoprazole 40 MG Tbec  Polyethylene Glycol 3350 17 g Pack  sennosides 8.6 MG Tabs  sucralfate 1 g Tabs Given      [id number] pantoprazole (PROTONIX) EC tab 40 mg 12/10/21 0502 Given      [id number] predniSONE (DELTASONE) tab 1 mg 12/09/21 1738 Given      [id number] predniSONE (DELTASONE) tab 1 mg 12/10/21 0849 Given      [id number] sucralfate (CARAFATE) tab 1 g mg/dL — [facility])   Calculated Osmolality 299 275 - 295 mOsm/kg H [facility])   GFR, Non- 67 >=60 — [facility])   GFR, -American 77 >=60 — [facility])            CBC With Differential With Platelet [9579 Assay Cutoff: 500 ng/mL     Methadone Urine Negative Negative — [facility])   Comment: Methadone Assay Cutoff: 300 ng/mL     Opiate Urine Negative Negative — [facility])   Comment: Opiate Cut-off Value: 300 ng/mL       Oxycodone Urine Negativ Shaheen Tejeda MD   PATIENT ACCOUNT#:   [id number]    LOCATION:  [place] [address] RECORD #:   [id number]       YOB: 1945  ADMISSION DATE:       11/30/2021    HISTORY AND PHYSICAL EXAMINATION    CHIEF COMPLAINT:  Left lower lobe infiltrate home.    ALLERGIES:  No known drug allergies. FAMILY HISTORY:  Positive for hypertension and diabetes. SOCIAL HISTORY:  Ex-tobacco user. No current tobacco, alcohol or drug use. Requires assistance in her basic activities of daily living.     REVIEW inflammatory markers. Follow her oxygenation status. Obtain speech therapy to evaluate for aspiration. Resume her anticoagulation with Eliquis. Hold on her blood pressure medications considering her low blood pressure currently.   Further recommendation History  Social History    Socioeconomic History      Marital status:      Tobacco Use      Smoking status: Former Smoker      Smokeless tobacco: Never Used          Current Medications:  Piperacillin Sod-Tazobactam So (ZOSYN) 3.375 g in dextrose 5% DDIMER 3.73 (H) 11/30/2021    CRP 7.36 (H) 11/30/2021    TROP 0.069 (HH) 11/30/2021         Imaging  XR CHEST AP PORTABLE  (CPT=71045)    Result Date: 11/30/2021  CONCLUSION:  1. Ill-defined airspace disease at the periphery of the left lung base, which Dc summary#  > 30 min spent on [address] Discharge Diagnoses: covid pneumonia    Lace+ Score: 59  59-90 High Risk  29-58 Medium Risk  0-28   Low Risk. TCM Follow-Up Recommendation:  LACE > 58:  High Risk of readmission after discharge from the The patient's Approx Degree of Impairment: 57.7% has been calculated based on documentation in the [facility] '6 clicks' Inpatient Basic Mobility Short Form. Research supports that patients with this level of impairment may benefit from sub-acute rehab. concerns addressed; Alarm set    CURRENT GOALS     Goals to be met by: 12/20/21  Patient Goal Patient's self-stated goal is: to go home   Goal #1 Patient is able to demonstrate supine - sit EOB @ level: supervision      Goal #1   Current Status Mod A   Goal w/ RW & Min A. Pt could not tolerate amb further. At the end of the session, pt was left sitting in recliner chair w/ chair alarm on.     The patient's Approx Degree of Impairment: 50.57% has been calculated based on documentation in the [facility] '6 clicks' In Device: Rolling walker        Patient End of Session: Up in chair;Needs met;Call light within reach;RN aware of session/findings; All patient questions and concerns addressed; Alarm set    CURRENT GOALS     Goals to be met by: 12/20/21  Patient Goal Patient' at times. Functional mobility completed in a slow, labored manner. Pt completed bed mobility w/ Min A. Pt completed transfers w/ Min A. Pt amb 2 ft w/ RW & Min A. Pt could not tolerate amb further.  At the end of the session, pt was left sitting in recliner (G-Code): CK    FUNCTIONAL ABILITY STATUS  Functional Mobility/Gait Assessment  Gait Assistance: Minimum assistance  Distance (ft): 2  Assistive Device: Rolling walker        Patient End of Session: Up in chair;Needs met;Call light within reach;RN aware of reach. Handoff given to RN. Co-treated with PT. Pt agreeable to OT/PT session. Supervision for bed mobility after min encouragement. Min A for STS and SPT from bed to chair at RW level. Increased time required for processing. Pt able to adjust socks. Scale): 35.96  CMS Modifier (G-Code): CK    FUNCTIONAL TRANSFER ASSESSMENT  Supine to Sit : Supervision  Sit to Stand: Minimum assistance  Toilet Transfer: min A for SPT at RW level  Chair Transfer: min A for SPT at RW level    Bedroom Mobility: min A for for monitoring diet tolerance. Swallowing precautions/strategies discussed; Pt able to self-cue for use of strategies. Functional bite reflex/mastication/lingual skills on bite size food. No significant/mild oral retention.  Pharyngeal response appeared to self-feeding oral trials. Swallowing precautions reviewed. Pt able to self-initiate all swallowing strategies.   GOAL MET     FOLLOW UP  Follow Up Needed (Documentation Required): No  SLP Follow-up Date: 12/07/21  Number of Visits to Meet Established Goals: